# Patient Record
Sex: FEMALE | Race: WHITE | Employment: PART TIME | URBAN - METROPOLITAN AREA
[De-identification: names, ages, dates, MRNs, and addresses within clinical notes are randomized per-mention and may not be internally consistent; named-entity substitution may affect disease eponyms.]

---

## 2017-04-01 LAB
CHOLESTEROL: 191 MG/DL
GLUCOSE BLD-MCNC: 96 MG/DL (ref 70–140)
HDLC SERPL-MCNC: 39 MG/DL
LDL CHOLESTEROL CALCULATED: 103 MG/DL
PATIENT FASTING?: YES
TRIGL SERPL-MCNC: 243 MG/DL

## 2017-04-04 ENCOUNTER — EMPLOYEE WELLNESS (OUTPATIENT)
Dept: OTHER | Age: 42
End: 2017-04-04

## 2017-07-12 ENCOUNTER — HOSPITAL ENCOUNTER (OUTPATIENT)
Dept: ULTRASOUND IMAGING | Age: 42
Discharge: HOME | End: 2017-07-12
Attending: PHYSICIAN ASSISTANT | Admitting: PHYSICIAN ASSISTANT

## 2017-07-12 DIAGNOSIS — R11.2 NAUSEA AND VOMITING, INTRACTABILITY OF VOMITING NOT SPECIFIED, UNSPECIFIED VOMITING TYPE: ICD-10-CM

## 2017-07-12 DIAGNOSIS — R19.7 DIARRHEA, UNSPECIFIED TYPE: ICD-10-CM

## 2017-07-12 DIAGNOSIS — R10.13 ABDOMINAL PAIN, EPIGASTRIC: ICD-10-CM

## 2017-07-12 DIAGNOSIS — R10.11 ABDOMINAL PAIN, RIGHT UPPER QUADRANT: ICD-10-CM

## 2017-07-12 LAB
ALBUMIN SERPL-MCNC: 4.2 GM/DL (ref 3.4–5)
ALP BLD-CCNC: 115 IU/L (ref 40–129)
ALT SERPL-CCNC: 130 U/L (ref 10–40)
AMYLASE: 27 U/L (ref 25–115)
ANION GAP SERPL CALCULATED.3IONS-SCNC: 10 MMOL/L (ref 4–16)
AST SERPL-CCNC: 37 IU/L (ref 15–37)
BASOPHILS ABSOLUTE: 0.1 K/CU MM
BASOPHILS RELATIVE PERCENT: 0.6 % (ref 0–1)
BILIRUB SERPL-MCNC: 0.2 MG/DL (ref 0–1)
BILIRUBIN DIRECT: 0.2 MG/DL (ref 0–0.3)
BILIRUBIN, INDIRECT: 0 MG/DL (ref 0–0.7)
BUN BLDV-MCNC: 14 MG/DL (ref 6–23)
CALCIUM SERPL-MCNC: 9 MG/DL (ref 8.3–10.6)
CHLORIDE BLD-SCNC: 102 MMOL/L (ref 99–110)
CO2: 29 MMOL/L (ref 21–32)
CREAT SERPL-MCNC: 0.8 MG/DL (ref 0.6–1.1)
DIFFERENTIAL TYPE: ABNORMAL
EOSINOPHILS ABSOLUTE: 0.5 K/CU MM
EOSINOPHILS RELATIVE PERCENT: 5.9 % (ref 0–3)
GAMMA GLUTAMYL TRANSFERASE: 277 IU/L (ref 5–36)
GFR AFRICAN AMERICAN: >60 ML/MIN/1.73M2
GFR NON-AFRICAN AMERICAN: >60 ML/MIN/1.73M2
GLUCOSE BLD-MCNC: 102 MG/DL (ref 70–140)
HCT VFR BLD CALC: 41.8 % (ref 37–47)
HEMOGLOBIN: 13.8 GM/DL (ref 12.5–16)
IMMATURE NEUTROPHIL %: 0.3 % (ref 0–0.43)
LIPASE: 29 IU/L (ref 13–60)
LYMPHOCYTES ABSOLUTE: 2.9 K/CU MM
LYMPHOCYTES RELATIVE PERCENT: 31.8 % (ref 24–44)
MCH RBC QN AUTO: 28.5 PG (ref 27–31)
MCHC RBC AUTO-ENTMCNC: 33 % (ref 32–36)
MCV RBC AUTO: 86.4 FL (ref 78–100)
MONOCYTES ABSOLUTE: 0.5 K/CU MM
MONOCYTES RELATIVE PERCENT: 5.3 % (ref 0–4)
NUCLEATED RBC %: 0 %
PDW BLD-RTO: 12.7 % (ref 11.7–14.9)
PLATELET # BLD: 255 K/CU MM (ref 140–440)
PMV BLD AUTO: 9.2 FL (ref 7.5–11.1)
POTASSIUM SERPL-SCNC: 4.2 MMOL/L (ref 3.5–5.1)
RBC # BLD: 4.84 M/CU MM (ref 4.2–5.4)
SEGMENTED NEUTROPHILS ABSOLUTE COUNT: 5.1 K/CU MM
SEGMENTED NEUTROPHILS RELATIVE PERCENT: 56.1 % (ref 36–66)
SODIUM BLD-SCNC: 141 MMOL/L (ref 135–145)
TOTAL IMMATURE NEUTOROPHIL: 0.03 K/CU MM
TOTAL NUCLEATED RBC: 0 K/CU MM
TOTAL PROTEIN: 6.7 GM/DL (ref 6.4–8.2)
WBC # BLD: 9 K/CU MM (ref 4–10.5)

## 2017-08-02 PROBLEM — K80.20 CALCULUS OF GALLBLADDER WITHOUT CHOLECYSTITIS WITHOUT OBSTRUCTION: Status: ACTIVE | Noted: 2017-08-02

## 2017-08-21 PROBLEM — Z90.49 S/P LAPAROSCOPIC CHOLECYSTECTOMY: Status: ACTIVE | Noted: 2017-08-21

## 2017-09-13 ENCOUNTER — OFFICE VISIT (OUTPATIENT)
Dept: FAMILY MEDICINE CLINIC | Age: 42
End: 2017-09-13

## 2017-09-13 VITALS
TEMPERATURE: 98.1 F | HEART RATE: 77 BPM | DIASTOLIC BLOOD PRESSURE: 78 MMHG | HEIGHT: 61 IN | BODY MASS INDEX: 31.98 KG/M2 | WEIGHT: 169.4 LBS | SYSTOLIC BLOOD PRESSURE: 108 MMHG | OXYGEN SATURATION: 98 %

## 2017-09-13 DIAGNOSIS — R82.81 PYURIA: ICD-10-CM

## 2017-09-13 DIAGNOSIS — N30.00 ACUTE CYSTITIS WITHOUT HEMATURIA: Primary | ICD-10-CM

## 2017-09-13 DIAGNOSIS — R35.0 URINARY FREQUENCY: ICD-10-CM

## 2017-09-13 LAB
BILIRUBIN, POC: NORMAL
BLOOD URINE, POC: NORMAL
CLARITY, POC: CLEAR
COLOR, POC: YELLOW
GLUCOSE URINE, POC: NORMAL
KETONES, POC: NORMAL
LEUKOCYTE EST, POC: NORMAL
NITRITE, POC: NORMAL
PH, POC: 8
PROTEIN, POC: NORMAL
SPECIFIC GRAVITY, POC: 1.01
UROBILINOGEN, POC: NORMAL

## 2017-09-13 PROCEDURE — 81002 URINALYSIS NONAUTO W/O SCOPE: CPT | Performed by: NURSE PRACTITIONER

## 2017-09-13 PROCEDURE — 99213 OFFICE O/P EST LOW 20 MIN: CPT | Performed by: NURSE PRACTITIONER

## 2017-09-13 RX ORDER — SULFAMETHOXAZOLE AND TRIMETHOPRIM 800; 160 MG/1; MG/1
1 TABLET ORAL 2 TIMES DAILY
Qty: 6 TABLET | Refills: 0 | Status: SHIPPED | OUTPATIENT
Start: 2017-09-13 | End: 2017-09-16

## 2017-09-13 ASSESSMENT — PATIENT HEALTH QUESTIONNAIRE - PHQ9
SUM OF ALL RESPONSES TO PHQ9 QUESTIONS 1 & 2: 0
SUM OF ALL RESPONSES TO PHQ QUESTIONS 1-9: 0
1. LITTLE INTEREST OR PLEASURE IN DOING THINGS: 0
2. FEELING DOWN, DEPRESSED OR HOPELESS: 0

## 2017-09-13 ASSESSMENT — ENCOUNTER SYMPTOMS: GASTROINTESTINAL NEGATIVE: 1

## 2017-09-15 ENCOUNTER — TELEPHONE (OUTPATIENT)
Dept: FAMILY MEDICINE CLINIC | Age: 42
End: 2017-09-15

## 2017-09-15 LAB — URINE CULTURE, ROUTINE: NORMAL

## 2018-01-10 ENCOUNTER — OFFICE VISIT (OUTPATIENT)
Dept: FAMILY MEDICINE CLINIC | Age: 43
End: 2018-01-10

## 2018-01-10 VITALS
OXYGEN SATURATION: 98 % | DIASTOLIC BLOOD PRESSURE: 74 MMHG | HEART RATE: 73 BPM | BODY MASS INDEX: 33.3 KG/M2 | WEIGHT: 174.8 LBS | TEMPERATURE: 97.8 F | SYSTOLIC BLOOD PRESSURE: 102 MMHG

## 2018-01-10 DIAGNOSIS — M54.5 LOW BACK PAIN, UNSPECIFIED BACK PAIN LATERALITY, UNSPECIFIED CHRONICITY, WITH SCIATICA PRESENCE UNSPECIFIED: Primary | ICD-10-CM

## 2018-01-10 DIAGNOSIS — N89.8 VAGINAL ITCHING: ICD-10-CM

## 2018-01-10 LAB
BILIRUBIN, POC: NORMAL
BLOOD URINE, POC: NORMAL
CLARITY, POC: CLEAR
COLOR, POC: NORMAL
GLUCOSE URINE, POC: NORMAL
KETONES, POC: NORMAL
LEUKOCYTE EST, POC: NORMAL
NITRITE, POC: NORMAL
PH, POC: 5.5
PROTEIN, POC: NORMAL
SPECIFIC GRAVITY, POC: 1.03
UROBILINOGEN, POC: NORMAL

## 2018-01-10 PROCEDURE — 81002 URINALYSIS NONAUTO W/O SCOPE: CPT | Performed by: NURSE PRACTITIONER

## 2018-01-10 PROCEDURE — 99213 OFFICE O/P EST LOW 20 MIN: CPT | Performed by: NURSE PRACTITIONER

## 2018-01-10 RX ORDER — NAPROXEN 500 MG/1
500 TABLET ORAL 2 TIMES DAILY WITH MEALS
Qty: 28 TABLET | Refills: 0 | Status: SHIPPED | OUTPATIENT
Start: 2018-01-10 | End: 2019-02-04 | Stop reason: ALTCHOICE

## 2018-01-10 RX ORDER — ACETAMINOPHEN 500 MG
500 TABLET ORAL EVERY 6 HOURS PRN
COMMUNITY

## 2018-01-10 RX ORDER — CYCLOBENZAPRINE HCL 5 MG
5 TABLET ORAL NIGHTLY PRN
Qty: 10 TABLET | Refills: 0 | Status: SHIPPED | OUTPATIENT
Start: 2018-01-10 | End: 2018-01-20

## 2018-01-10 RX ORDER — FLUCONAZOLE 150 MG/1
150 TABLET ORAL ONCE
Qty: 1 TABLET | Refills: 0 | Status: SHIPPED | OUTPATIENT
Start: 2018-01-10 | End: 2018-01-10

## 2018-01-10 ASSESSMENT — ENCOUNTER SYMPTOMS
SHORTNESS OF BREATH: 0
BOWEL INCONTINENCE: 0
VOMITING: 0
WHEEZING: 0
NAUSEA: 0
CHEST TIGHTNESS: 0
CONSTIPATION: 0
DIARRHEA: 0
BACK PAIN: 1

## 2018-01-10 NOTE — PROGRESS NOTES
Rivasil Aide  1975  43 y.o. SUBJECT ALMAS:    Chief Complaint   Patient presents with    Back Pain     Lower back pain x 2 days. She lifted a heavy load of laundry. It is worse with movement.  Vaginitis     x 2 days, she took a bath in lavender epsom salts and thinks this caused it. Otherwise well 43year old female presents for acute low back pain for which she took an epsom salt bath and now believes she has a yeast infection as she is having new white, cottage cheese like discharge and itching. Back Pain   This is a new problem. The current episode started in the past 7 days (sudden pain with lifting laundry basket and twisting, exacerbated by pushing computer on wheels at work for 8 hours this past weekend). The problem occurs constantly. The pain is present in the lumbar spine. Quality: sharp. The pain does not radiate. The pain is at a severity of 8/10. The pain is moderate. The pain is worse during the day. Exacerbated by: lifting; no impact with coughing or sneezing. Stiffness is present in the morning. Pertinent negatives include no bladder incontinence, bowel incontinence, chest pain, dysuria, fever, headaches, leg pain, numbness, paresthesias, pelvic pain, perianal numbness or weakness. She has tried heat for the symptoms. The treatment provided mild relief. Vaginal Discharge   The patient's primary symptoms include genital itching and vaginal discharge. The patient's pertinent negatives include no genital lesions or pelvic pain. This is a new problem. The current episode started yesterday. The problem occurs constantly. The problem has been gradually worsening. She is not pregnant. Associated symptoms include back pain. Pertinent negatives include no chills, constipation, diarrhea, dysuria, fever, flank pain, frequency, headaches, nausea, urgency or vomiting. The vaginal discharge was white and malodorous. There has been no bleeding. She is sexually active.  No, her partner does not have an STD. She uses nothing for contraception. Her menstrual history has been regular. There is no history of an STD. Past Medical History:   Diagnosis Date    Asthma     last flare up 2016- mostly with exertion       Current Outpatient Prescriptions on File Prior to Visit   Medication Sig Dispense Refill    melatonin 3 MG TABS tablet Take 5 mg by mouth daily      albuterol sulfate  (90 Base) MCG/ACT inhaler Inhale 2 puffs into the lungs every 6 hours as needed for Wheezing       No current facility-administered medications on file prior to visit. Past Medical, Family, and Social History have been reviewed today. Review of Systems   Constitutional: Negative for chills, diaphoresis, fatigue and fever. Respiratory: Negative for chest tightness, shortness of breath and wheezing. Cardiovascular: Negative for chest pain and palpitations. Gastrointestinal: Negative for bowel incontinence, constipation, diarrhea, nausea and vomiting. Genitourinary: Positive for vaginal discharge. Negative for bladder incontinence, dysuria, flank pain, frequency, menstrual problem, pelvic pain and urgency. Musculoskeletal: Positive for back pain. Neurological: Negative for dizziness, facial asymmetry, weakness, numbness, headaches and paresthesias. OBJECTIVE:     /74   Pulse 73   Temp 97.8 °F (36.6 °C) (Oral)   Wt 174 lb 12.8 oz (79.3 kg)   LMP 12/13/2017 (Within Days)   SpO2 98%   BMI 33.30 kg/m²     Physical Exam   Constitutional: She is oriented to person, place, and time. She appears well-developed and well-nourished. HENT:   Head: Normocephalic. Cardiovascular: Normal rate, regular rhythm and normal heart sounds. Pulmonary/Chest: Effort normal and breath sounds normal.   Abdominal: Soft. Bowel sounds are normal. There is no tenderness. Musculoskeletal:        Lumbar back: She exhibits decreased range of motion (extension), tenderness, pain and spasm.

## 2018-01-10 NOTE — PATIENT INSTRUCTIONS
We are committed to providing you the best care possible. If you receive a survey after visiting one of our offices, please take time to share your experience concerning your physician office visit. These surveys are confidential and no health information about you is shared. We are eager to improve for you and we are counting on your feedback to help make that happen. Patient Education        Learning About How to Have a Healthy Back  What causes back pain? Back pain is often caused by overuse, strain, or injury. For example, people often hurt their backs playing sports or working in the yard, being jolted in a car accident, or lifting something too heavy. Aging plays a part too. Your bones and muscles tend to lose strength as you age, which makes injury more likely. The spongy discs between the bones of the spine (vertebrae) may suffer from wear and tear and no longer provide enough cushion between the bones. A disc that bulges or breaks open (herniated disc) can press on nerves, causing back pain. In some people, back pain is the result of arthritis, broken vertebrae caused by bone loss (osteoporosis), illness, or a spine problem. Although most people have back pain at one time or another, there are steps you can take to make it less likely. How can you have a healthy back? Reduce stress on your back through good posture  Slumping or slouching alone may not cause low back pain. But after the back has been strained or injured, bad posture can make pain worse. · Sleep in a position that maintains your back's normal curves and on a mattress that feels comfortable. Sleep on your side with a pillow between your knees, or sleep on your back with a pillow under your knees. These positions can reduce strain on your back. · Stand and sit up straight. \"Good posture\" generally means your ears, shoulders, and hips are in a straight line.   · If you must stand for a long time, put one foot on a stool, ledge, or box. Switch feet every now and then. · Sit in a chair that is low enough to let you place both feet flat on the floor with both knees nearly level with your hips. If your chair or desk is too high, use a footrest to raise your knees. Place a small pillow, a rolled-up towel, or a lumbar roll in the curve of your back if you need extra support. · Try a kneeling chair, which helps tilt your hips forward. This takes pressure off your lower back. · Try sitting on an exercise ball. It can rock from side to side, which helps keep your back loose. · When driving, keep your knees nearly level with your hips. Sit straight, and drive with both hands on the steering wheel. Your arms should be in a slightly bent position. Reduce stress on your back through careful lifting  · Squat down, bending at the hips and knees only. If you need to, put one knee to the floor and extend your other knee in front of you, bent at a right angle (half kneeling). · Press your chest straight forward. This helps keep your upper back straight while keeping a slight arch in your low back. · Hold the load as close to your body as possible, at the level of your belly button (navel). · Use your feet to change direction, taking small steps. · Lead with your hips as you change direction. Keep your shoulders in line with your hips as you move. · Set down your load carefully, squatting with your knees and hips only. Exercise and stretch your back  · Do some exercise on most days of the week, if your doctor says it is okay. You can walk, run, swim, or cycle. · Stretch your back muscles. Here are a few exercises to try:  Caren Arce on your back, and gently pull one bent knee to your chest. Put that foot back on the floor, and then pull the other knee to your chest.  ¨ Do pelvic tilts. Lie on your back with your knees bent. Tighten your stomach muscles. Pull your belly button (navel) in and up toward your ribs.  You should feel like your back is pressing to the floor and your hips and pelvis are slightly lifting off the floor. Hold for 6 seconds while breathing smoothly. ¨ Sit with your back flat against a wall. · Keep your core muscles strong. The muscles of your back, belly (abdomen), and buttocks support your spine. ¨ Pull in your belly and imagine pulling your navel toward your spine. Hold this for 6 seconds, then relax. Remember to keep breathing normally as you tense your muscles. ¨ Do curl-ups. Always do them with your knees bent. Keep your low back on the floor, and curl your shoulders toward your knees using a smooth, slow motion. Keep your arms folded across your chest. If this bothers your neck, try putting your hands behind your neck (not your head), with your elbows spread apart. ¨ Lie on your back with your knees bent and your feet flat on the floor. Tighten your belly muscles, and then push with your feet and raise your buttocks up a few inches. Hold this position 6 seconds as you continue to breathe normally, then lower yourself slowly to the floor. Repeat 8 to 12 times. ¨ If you like group exercise, try Pilates or yoga. These classes have poses that strengthen the core muscles. Lead a healthy lifestyle  · Stay at a healthy weight to avoid strain on your back. · Do not smoke. Smoking increases the risk of osteoporosis, which weakens the spine. If you need help quitting, talk to your doctor about stop-smoking programs and medicines. These can increase your chances of quitting for good. Where can you learn more? Go to https://[employer]. org and sign in to your [employer] account. Enter L315 in the [employer] box to learn more about \"Learning About How to Have a Healthy Back. \"     If you do not have an account, please click on the \"Sign Up Now\" link. Current as of: March 21, 2017  Content Version: 11.5  © 3538-1922 [employer]. Care instructions adapted under license by [facility] ([facility]).  If you have questions about a medical condition or this instruction, always ask your healthcare professional. [address] any warranty or liability for your use of this information. Patient Education        Back Stretches: Exercises  Your Care Instructions  Here are some examples of exercises for stretching your back. Start each exercise slowly. Ease off the exercise if you start to have pain. Your doctor or physical therapist will tell you when you can start these exercises and which ones will work best for you. How to do the exercises  Overhead stretch    1. Stand comfortably with your feet shoulder-width apart. 2. Looking straight ahead, raise both arms over your head and reach toward the ceiling. Do not allow your head to tilt back. 3. Hold for 15 to 30 seconds, then lower your arms to your sides. 4. Repeat 2 to 4 times. Side stretch    1. Stand comfortably with your feet shoulder-width apart. 2. Raise one arm over your head, and then lean to the other side. 3. Slide your hand down your leg as you let the weight of your arm gently stretch your side muscles. Hold for 15 to 30 seconds. 4. Repeat 2 to 4 times on each side. Press-up    1. Lie on your stomach, supporting your body with your forearms. 2. Press your elbows down into the floor to raise your upper back. As you do this, relax your stomach muscles and allow your back to arch without using your back muscles. As your press up, do not let your hips or pelvis come off the floor. 3. Hold for 15 to 30 seconds, then relax. 4. Repeat 2 to 4 times. Relax and rest    1. Lie on your back with a rolled towel under your neck and a pillow under your knees. Extend your arms comfortably to your sides. 2. Relax and breathe normally. 3. Remain in this position for about 10 minutes. 4. If you can, do this 2 or 3 times each day. Follow-up care is a key part of your treatment and safety.  Be sure to make and go to all appointments, and

## 2018-03-20 VITALS — WEIGHT: 166 LBS | BODY MASS INDEX: 31.37 KG/M2

## 2018-06-11 ENCOUNTER — EMPLOYEE WELLNESS (OUTPATIENT)
Dept: OTHER | Age: 43
End: 2018-06-11

## 2018-06-11 LAB
CHOLESTEROL: 158 MG/DL
GLUCOSE BLD-MCNC: 96 MG/DL (ref 70–99)
HDLC SERPL-MCNC: 42 MG/DL
LDL CHOLESTEROL CALCULATED: 79 MG/DL
PATIENT FASTING?: YES
TRIGL SERPL-MCNC: 187 MG/DL

## 2018-06-18 ENCOUNTER — OFFICE VISIT (OUTPATIENT)
Dept: FAMILY MEDICINE CLINIC | Age: 43
End: 2018-06-18

## 2018-06-18 VITALS
BODY MASS INDEX: 32.77 KG/M2 | DIASTOLIC BLOOD PRESSURE: 80 MMHG | SYSTOLIC BLOOD PRESSURE: 124 MMHG | HEART RATE: 82 BPM | HEIGHT: 61 IN | WEIGHT: 173.6 LBS | OXYGEN SATURATION: 98 %

## 2018-06-18 DIAGNOSIS — F51.01 PRIMARY INSOMNIA: ICD-10-CM

## 2018-06-18 DIAGNOSIS — R06.2 WHEEZING: ICD-10-CM

## 2018-06-18 DIAGNOSIS — J30.1 SEASONAL ALLERGIC RHINITIS DUE TO POLLEN, UNSPECIFIED CHRONICITY: ICD-10-CM

## 2018-06-18 DIAGNOSIS — E78.1 HYPERTRIGLYCERIDEMIA: Primary | ICD-10-CM

## 2018-06-18 PROCEDURE — 99213 OFFICE O/P EST LOW 20 MIN: CPT | Performed by: FAMILY MEDICINE

## 2018-06-19 VITALS — WEIGHT: 171 LBS | BODY MASS INDEX: 32.58 KG/M2

## 2018-06-29 ASSESSMENT — ENCOUNTER SYMPTOMS
ABDOMINAL PAIN: 0
DOUBLE VISION: 0
SPUTUM PRODUCTION: 0
COUGH: 0
STRIDOR: 0
CONSTIPATION: 0
BLOOD IN STOOL: 0
SHORTNESS OF BREATH: 0
BLURRED VISION: 0
WHEEZING: 0
EYE PAIN: 0
DIARRHEA: 0
BACK PAIN: 0
HEARTBURN: 0

## 2018-09-07 ENCOUNTER — HOSPITAL ENCOUNTER (OUTPATIENT)
Dept: OTHER | Age: 43
Discharge: HOME | End: 2018-09-07
Attending: NURSE PRACTITIONER | Admitting: NURSE PRACTITIONER

## 2018-09-07 ENCOUNTER — OFFICE VISIT (OUTPATIENT)
Dept: FAMILY MEDICINE CLINIC | Age: 43
End: 2018-09-07

## 2018-09-07 VITALS
SYSTOLIC BLOOD PRESSURE: 114 MMHG | DIASTOLIC BLOOD PRESSURE: 68 MMHG | WEIGHT: 172 LBS | OXYGEN SATURATION: 99 % | HEART RATE: 63 BPM | BODY MASS INDEX: 32.77 KG/M2 | TEMPERATURE: 98.3 F

## 2018-09-07 DIAGNOSIS — R09.81 NASAL CONGESTION: ICD-10-CM

## 2018-09-07 DIAGNOSIS — J40 BRONCHITIS: ICD-10-CM

## 2018-09-07 DIAGNOSIS — N39.0 ACUTE UTI: Primary | ICD-10-CM

## 2018-09-07 DIAGNOSIS — R39.9 UTI SYMPTOMS: ICD-10-CM

## 2018-09-07 LAB
BILIRUBIN, POC: NEGATIVE
BLOOD URINE, POC: NEGATIVE
CLARITY, POC: CLEAR
COLOR, POC: YELLOW
GLUCOSE URINE, POC: NEGATIVE
KETONES, POC: NEGATIVE
LEUKOCYTE EST, POC: ABNORMAL
NITRITE, POC: POSITIVE
PH, POC: 6
PROTEIN, POC: NEGATIVE
SPECIFIC GRAVITY, POC: 1.02
UROBILINOGEN, POC: ABNORMAL

## 2018-09-07 PROCEDURE — 81002 URINALYSIS NONAUTO W/O SCOPE: CPT | Performed by: NURSE PRACTITIONER

## 2018-09-07 PROCEDURE — 99213 OFFICE O/P EST LOW 20 MIN: CPT | Performed by: NURSE PRACTITIONER

## 2018-09-07 RX ORDER — AZELASTINE 1 MG/ML
1 SPRAY, METERED NASAL 2 TIMES DAILY
Qty: 1 BOTTLE | Refills: 0 | Status: SHIPPED | OUTPATIENT
Start: 2018-09-07

## 2018-09-07 RX ORDER — METHYLPREDNISOLONE 4 MG/1
TABLET ORAL
Qty: 1 KIT | Refills: 0 | Status: SHIPPED | OUTPATIENT
Start: 2018-09-07 | End: 2018-09-13

## 2018-09-07 RX ORDER — SULFAMETHOXAZOLE AND TRIMETHOPRIM 800; 160 MG/1; MG/1
1 TABLET ORAL 2 TIMES DAILY
Qty: 6 TABLET | Refills: 0 | Status: SHIPPED | OUTPATIENT
Start: 2018-09-07 | End: 2021-10-25 | Stop reason: SDUPTHER

## 2018-09-07 RX ORDER — PROMETHAZINE HYDROCHLORIDE AND CODEINE PHOSPHATE 6.25; 1 MG/5ML; MG/5ML
5 SYRUP ORAL NIGHTLY PRN
Qty: 35 ML | Refills: 0 | Status: SHIPPED | OUTPATIENT
Start: 2018-09-07 | End: 2019-02-04 | Stop reason: SDUPTHER

## 2018-09-07 RX ORDER — BENZONATATE 100 MG/1
100 CAPSULE ORAL 3 TIMES DAILY PRN
Qty: 21 CAPSULE | Refills: 0 | Status: SHIPPED | OUTPATIENT
Start: 2018-09-07 | End: 2018-09-14

## 2018-09-07 ASSESSMENT — ENCOUNTER SYMPTOMS
VOMITING: 0
SINUS PAIN: 0
DIARRHEA: 0
WHEEZING: 0
RHINORRHEA: 1
CHEST TIGHTNESS: 1
SORE THROAT: 0
SHORTNESS OF BREATH: 1
NAUSEA: 0
COUGH: 1
SINUS PRESSURE: 1

## 2018-09-07 NOTE — PROGRESS NOTES
throat. Respiratory: Positive for cough, chest tightness and shortness of breath. Negative for wheezing. Cardiovascular: Negative for chest pain and palpitations. Gastrointestinal: Negative for diarrhea, nausea and vomiting. Genitourinary: Positive for frequency. Negative for flank pain, hematuria, hesitancy and urgency. Musculoskeletal: Negative for myalgias. Skin: Negative for rash. Neurological: Negative for dizziness, light-headedness and headaches. Current Outpatient Prescriptions   Medication Sig Dispense Refill    azelastine (ASTELIN) 0.1 % nasal spray 1 spray by Nasal route 2 times daily Use in each nostril as directed 1 Bottle 0    methylPREDNISolone (MEDROL DOSEPACK) 4 MG tablet Take by mouth. 1 kit 0    promethazine-codeine (PHENERGAN WITH CODEINE) 6.25-10 MG/5ML syrup Take 5 mLs by mouth nightly as needed for Cough for up to 7 days. . 35 mL 0    benzonatate (TESSALON PERLES) 100 MG capsule Take 1 capsule by mouth 3 times daily as needed for Cough 21 capsule 0    Cetirizine HCl (ZYRTEC ALLERGY PO) Take 1 tablet by mouth as needed       DiphenhydrAMINE HCl (BENADRYL ALLERGY PO) Take 1 tablet by mouth as needed      acetaminophen (TYLENOL) 500 MG tablet Take 500 mg by mouth every 6 hours as needed for Pain      melatonin 3 MG TABS tablet Take 5 mg by mouth daily      albuterol sulfate  (90 Base) MCG/ACT inhaler Inhale 2 puffs into the lungs every 6 hours as needed for Wheezing      naproxen (NAPROSYN) 500 MG tablet Take 1 tablet by mouth 2 times daily (with meals) for 14 days 28 tablet 0     No current facility-administered medications for this visit. Past medical, family,surgical history reviewed today. Objective:  /68   Pulse 63   Temp 98.3 °F (36.8 °C) (Oral)   Wt 172 lb (78 kg)   LMP 08/30/2018 (Approximate)   SpO2 99%   Breastfeeding?  No   BMI 32.77 kg/m²   BP Readings from Last 3 Encounters:   09/07/18 114/68   06/18/18 124/80   01/10/18 discontinued medications. Care discussed with patient. Questions answered. Patient verbalizes understanding and agrees with plan. After visit summary provided. Advised to call for any problems, questions, or concerns. Patient will be contacted in two to three days to check on progress.          Return if symptoms worsen or fail to improve.                                               Signed:  SHAHRIAR Govea CNP  09/11/18  5:33 PM

## 2018-09-10 LAB
CULTURE: NORMAL
Lab: NORMAL
ORGANISM: NORMAL
REPORT STATUS: NORMAL
SPECIMEN: NORMAL
TOTAL COLONY COUNT: NORMAL

## 2018-09-11 ENCOUNTER — PATIENT MESSAGE (OUTPATIENT)
Dept: FAMILY MEDICINE CLINIC | Age: 43
End: 2018-09-11

## 2018-10-08 ENCOUNTER — OFFICE VISIT (OUTPATIENT)
Dept: FAMILY MEDICINE CLINIC | Age: 43
End: 2018-10-08
Payer: COMMERCIAL

## 2018-10-08 VITALS
WEIGHT: 172.8 LBS | HEART RATE: 75 BPM | BODY MASS INDEX: 32.92 KG/M2 | DIASTOLIC BLOOD PRESSURE: 78 MMHG | OXYGEN SATURATION: 98 % | SYSTOLIC BLOOD PRESSURE: 118 MMHG

## 2018-10-08 DIAGNOSIS — G89.29 CHRONIC LOW BACK PAIN WITHOUT SCIATICA, UNSPECIFIED BACK PAIN LATERALITY: ICD-10-CM

## 2018-10-08 DIAGNOSIS — M54.50 CHRONIC LOW BACK PAIN WITHOUT SCIATICA, UNSPECIFIED BACK PAIN LATERALITY: ICD-10-CM

## 2018-10-08 PROCEDURE — 99213 OFFICE O/P EST LOW 20 MIN: CPT | Performed by: FAMILY MEDICINE

## 2018-10-08 RX ORDER — PHENTERMINE HYDROCHLORIDE 37.5 MG/1
37.5 TABLET ORAL
Qty: 30 TABLET | Refills: 0 | Status: SHIPPED | OUTPATIENT
Start: 2018-10-08 | End: 2018-11-01 | Stop reason: SDUPTHER

## 2018-10-08 ASSESSMENT — PATIENT HEALTH QUESTIONNAIRE - PHQ9
SUM OF ALL RESPONSES TO PHQ QUESTIONS 1-9: 0
SUM OF ALL RESPONSES TO PHQ QUESTIONS 1-9: 0
SUM OF ALL RESPONSES TO PHQ9 QUESTIONS 1 & 2: 0
2. FEELING DOWN, DEPRESSED OR HOPELESS: 0
1. LITTLE INTEREST OR PLEASURE IN DOING THINGS: 0

## 2018-10-20 ASSESSMENT — ENCOUNTER SYMPTOMS
COUGH: 0
ABDOMINAL PAIN: 0
BLURRED VISION: 0

## 2018-11-01 ENCOUNTER — OFFICE VISIT (OUTPATIENT)
Dept: FAMILY MEDICINE CLINIC | Age: 43
End: 2018-11-01
Payer: COMMERCIAL

## 2018-11-01 VITALS
SYSTOLIC BLOOD PRESSURE: 116 MMHG | HEART RATE: 90 BPM | WEIGHT: 169.4 LBS | DIASTOLIC BLOOD PRESSURE: 70 MMHG | BODY MASS INDEX: 32.27 KG/M2 | OXYGEN SATURATION: 99 %

## 2018-11-01 PROCEDURE — 99213 OFFICE O/P EST LOW 20 MIN: CPT | Performed by: FAMILY MEDICINE

## 2018-11-01 RX ORDER — PHENTERMINE HYDROCHLORIDE 37.5 MG/1
37.5 TABLET ORAL
Qty: 30 TABLET | Refills: 0 | Status: SHIPPED | OUTPATIENT
Start: 2018-11-06 | End: 2018-12-04 | Stop reason: SDUPTHER

## 2018-12-04 ENCOUNTER — OFFICE VISIT (OUTPATIENT)
Dept: FAMILY MEDICINE CLINIC | Age: 43
End: 2018-12-04
Payer: COMMERCIAL

## 2018-12-04 VITALS
DIASTOLIC BLOOD PRESSURE: 70 MMHG | WEIGHT: 167.2 LBS | SYSTOLIC BLOOD PRESSURE: 114 MMHG | BODY MASS INDEX: 31.85 KG/M2 | OXYGEN SATURATION: 98 % | HEART RATE: 86 BPM

## 2018-12-04 DIAGNOSIS — B00.89 HERPETIC DERMATITIS: Primary | ICD-10-CM

## 2018-12-04 PROCEDURE — 99214 OFFICE O/P EST MOD 30 MIN: CPT | Performed by: FAMILY MEDICINE

## 2018-12-04 RX ORDER — PHENTERMINE HYDROCHLORIDE 37.5 MG/1
37.5 TABLET ORAL
Qty: 30 TABLET | Refills: 0 | Status: SHIPPED | OUTPATIENT
Start: 2018-12-04 | End: 2019-01-03

## 2018-12-04 RX ORDER — VALACYCLOVIR HYDROCHLORIDE 1 G/1
1000 TABLET, FILM COATED ORAL DAILY
Qty: 5 TABLET | Refills: 2 | Status: SHIPPED | OUTPATIENT
Start: 2018-12-04 | End: 2018-12-09

## 2018-12-09 ASSESSMENT — ENCOUNTER SYMPTOMS
WHEEZING: 0
BACK PAIN: 0
SHORTNESS OF BREATH: 0
CHEST TIGHTNESS: 0
ABDOMINAL PAIN: 0
COUGH: 0

## 2019-02-04 ENCOUNTER — OFFICE VISIT (OUTPATIENT)
Dept: FAMILY MEDICINE CLINIC | Age: 44
End: 2019-02-04
Payer: COMMERCIAL

## 2019-02-04 VITALS
OXYGEN SATURATION: 98 % | DIASTOLIC BLOOD PRESSURE: 72 MMHG | TEMPERATURE: 98.3 F | BODY MASS INDEX: 30.47 KG/M2 | HEART RATE: 112 BPM | HEIGHT: 62 IN | WEIGHT: 165.6 LBS | SYSTOLIC BLOOD PRESSURE: 108 MMHG

## 2019-02-04 DIAGNOSIS — R68.89 FLU-LIKE SYMPTOMS: ICD-10-CM

## 2019-02-04 DIAGNOSIS — R09.81 NASAL CONGESTION: ICD-10-CM

## 2019-02-04 DIAGNOSIS — J40 BRONCHITIS: Primary | ICD-10-CM

## 2019-02-04 LAB
INFLUENZA A ANTIBODY: NEGATIVE
INFLUENZA B ANTIBODY: NEGATIVE

## 2019-02-04 PROCEDURE — 87804 INFLUENZA ASSAY W/OPTIC: CPT | Performed by: NURSE PRACTITIONER

## 2019-02-04 PROCEDURE — 99213 OFFICE O/P EST LOW 20 MIN: CPT | Performed by: NURSE PRACTITIONER

## 2019-02-04 RX ORDER — BENZONATATE 100 MG/1
100 CAPSULE ORAL 3 TIMES DAILY PRN
Qty: 20 CAPSULE | Refills: 0 | Status: SHIPPED | OUTPATIENT
Start: 2019-02-04 | End: 2019-05-24

## 2019-02-04 RX ORDER — PROMETHAZINE HYDROCHLORIDE AND CODEINE PHOSPHATE 6.25; 1 MG/5ML; MG/5ML
5 SYRUP ORAL NIGHTLY PRN
Qty: 35 ML | Refills: 0 | Status: SHIPPED | OUTPATIENT
Start: 2019-02-04 | End: 2019-02-11

## 2019-02-04 RX ORDER — METHYLPREDNISOLONE 4 MG/1
TABLET ORAL
Qty: 1 KIT | Refills: 0 | Status: SHIPPED | OUTPATIENT
Start: 2019-02-04 | End: 2019-02-10

## 2019-02-04 ASSESSMENT — ENCOUNTER SYMPTOMS
CHEST TIGHTNESS: 1
SHORTNESS OF BREATH: 0
COUGH: 1
HEMOPTYSIS: 0
SORE THROAT: 0
NAUSEA: 0
HEARTBURN: 0
DIARRHEA: 0
SINUS PAIN: 0
WHEEZING: 0
RHINORRHEA: 1
SINUS PRESSURE: 1
VOMITING: 1

## 2019-02-09 ENCOUNTER — TELEPHONE (OUTPATIENT)
Dept: FAMILY MEDICINE CLINIC | Age: 44
End: 2019-02-09

## 2019-02-09 RX ORDER — AZITHROMYCIN 250 MG/1
TABLET, FILM COATED ORAL
Qty: 6 TABLET | Refills: 0 | Status: SHIPPED | OUTPATIENT
Start: 2019-02-09 | End: 2019-05-24

## 2019-05-24 ENCOUNTER — OFFICE VISIT (OUTPATIENT)
Dept: FAMILY MEDICINE CLINIC | Age: 44
End: 2019-05-24
Payer: COMMERCIAL

## 2019-05-24 VITALS
SYSTOLIC BLOOD PRESSURE: 122 MMHG | BODY MASS INDEX: 31.28 KG/M2 | OXYGEN SATURATION: 96 % | HEART RATE: 73 BPM | WEIGHT: 171 LBS | DIASTOLIC BLOOD PRESSURE: 72 MMHG

## 2019-05-24 DIAGNOSIS — Z00.00 WELL ADULT EXAM: Primary | ICD-10-CM

## 2019-05-24 DIAGNOSIS — Z23 NEED FOR PROPHYLACTIC VACCINATION AGAINST DIPHTHERIA-TETANUS-PERTUSSIS (DTP): ICD-10-CM

## 2019-05-24 LAB
A/G RATIO: 1.8 (ref 1.1–2.2)
ALBUMIN SERPL-MCNC: 4.5 G/DL (ref 3.4–5)
ALP BLD-CCNC: 87 U/L (ref 40–129)
ALT SERPL-CCNC: 19 U/L (ref 10–40)
ANION GAP SERPL CALCULATED.3IONS-SCNC: 12 MMOL/L (ref 3–16)
AST SERPL-CCNC: 16 U/L (ref 15–37)
BILIRUB SERPL-MCNC: 0.4 MG/DL (ref 0–1)
BUN BLDV-MCNC: 12 MG/DL (ref 7–20)
CALCIUM SERPL-MCNC: 9.1 MG/DL (ref 8.3–10.6)
CHLORIDE BLD-SCNC: 104 MMOL/L (ref 99–110)
CHOLESTEROL, FASTING: 193 MG/DL (ref 0–199)
CO2: 24 MMOL/L (ref 21–32)
CREAT SERPL-MCNC: 0.7 MG/DL (ref 0.6–1.1)
GFR AFRICAN AMERICAN: >60
GFR NON-AFRICAN AMERICAN: >60
GLOBULIN: 2.5 G/DL
GLUCOSE FASTING: 109 MG/DL (ref 70–99)
HDLC SERPL-MCNC: 40 MG/DL (ref 40–60)
LDL CHOLESTEROL CALCULATED: 114 MG/DL
POTASSIUM SERPL-SCNC: 3.9 MMOL/L (ref 3.5–5.1)
SODIUM BLD-SCNC: 140 MMOL/L (ref 136–145)
TOTAL PROTEIN: 7 G/DL (ref 6.4–8.2)
TRIGLYCERIDE, FASTING: 195 MG/DL (ref 0–150)
VLDLC SERPL CALC-MCNC: 39 MG/DL

## 2019-05-24 PROCEDURE — 90471 IMMUNIZATION ADMIN: CPT | Performed by: FAMILY MEDICINE

## 2019-05-24 PROCEDURE — 90715 TDAP VACCINE 7 YRS/> IM: CPT | Performed by: FAMILY MEDICINE

## 2019-05-24 PROCEDURE — 99396 PREV VISIT EST AGE 40-64: CPT | Performed by: FAMILY MEDICINE

## 2019-05-24 PROCEDURE — 36415 COLL VENOUS BLD VENIPUNCTURE: CPT | Performed by: FAMILY MEDICINE

## 2019-05-24 ASSESSMENT — ENCOUNTER SYMPTOMS
SHORTNESS OF BREATH: 0
CHEST TIGHTNESS: 0
WHEEZING: 0
ABDOMINAL PAIN: 0
COUGH: 0
BACK PAIN: 0

## 2019-05-24 ASSESSMENT — PATIENT HEALTH QUESTIONNAIRE - PHQ9
SUM OF ALL RESPONSES TO PHQ QUESTIONS 1-9: 0
2. FEELING DOWN, DEPRESSED OR HOPELESS: 0
SUM OF ALL RESPONSES TO PHQ QUESTIONS 1-9: 0
1. LITTLE INTEREST OR PLEASURE IN DOING THINGS: 0
SUM OF ALL RESPONSES TO PHQ9 QUESTIONS 1 & 2: 0

## 2019-05-24 NOTE — PATIENT INSTRUCTIONS
Consider Dean Yip or similar now to reduce risk for macular degeneration, and consider seeing eye MD/DO and not just OD.

## 2019-05-24 NOTE — PROGRESS NOTES
Chief Complaint   Patient presents with    Annual Exam     be well with in checkup, waist measurement and other vitals, and FBW    Wound Check     on right foot, not infected or anything    Allergic Reaction     with APOORVA, not too many sx right now using benadryl prn       [place] NARRATIVE:    [id number]   Employee id number. She is here requesting us to do a [employer] Well [employer] employee health assessment. She understand it will be a comprehensive preventive exam for us and that we must order a CMP and lipids and cannot order the specialized BWW panel. Weight and waist measurement are recorded. Form is completed for information we have today and will be kept until labs return. .    FHx of mother with a fib and father with mac degen. Mom and dad with htn and cholesterol and DM2 in mom. This patient sees eye doc about every other year. RECENTLY (2 days) stepped on a nail working on her house,  wound almost an inch in through shoe and sock. She states minimal pain and no sign of infection and declines for this to be evaluated today. Allergies fair with need for nighttime benadryl a few nights a week. Weight is up slightly, BP is fine although increased from last visit. Patient is established unless otherwise noted. Above chief complaint and [place] obtained by this physician provider. Review of Systems   Constitutional: Negative for activity change, chills, fatigue and fever. HENT: Negative for congestion (intermittent sinus congestion). Respiratory: Negative for cough, chest tightness, shortness of breath and wheezing. Cardiovascular: Negative for chest pain and leg swelling. Gastrointestinal: Negative for abdominal pain. Musculoskeletal: Negative for back pain and myalgias. Skin: Negative for rash. Psychiatric/Behavioral: Negative for behavioral problems and dysphoric mood. No Known Allergies  Allergy historyupdated.     Outpatient Medications Marked as Taking for the 5/24/19 encounter (Office Visit) with Barrie Petersen MD   Medication Sig Dispense Refill    azelastine (ASTELIN) 0.1 % nasal spray 1 spray by Nasal route 2 times daily Use in each nostril as directed 1 Bottle 0    Cetirizine HCl (ZYRTEC ALLERGY PO) Take 1 tablet by mouth as needed       DiphenhydrAMINE HCl (BENADRYL ALLERGY PO) Take 1 tablet by mouth as needed      acetaminophen (TYLENOL) 500 MG tablet Take 500 mg by mouth every 6 hours as needed for Pain      melatonin 3 MG TABS tablet Take 5 mg by mouth daily      albuterol sulfate  (90 Base) MCG/ACT inhaler Inhale 2 puffs into the lungs every 6 hours as needed for Wheezing         Tobacco use history updated. Social History     Tobacco Use   Smoking Status Never Smoker   Smokeless Tobacco Never Used        Nursing note reviewed. Vitals:    05/24/19 1110   BP: 122/72   Site: Left Upper Arm   Position: Sitting   Cuff Size: Medium Adult   Pulse: 73   SpO2: 96%   Weight: 171 lb (77.6 kg)          BP Readings from Last 3 Encounters:   05/24/19 122/72   02/04/19 108/72   12/04/18 114/70     Wt Readings from Last 3 Encounters:   05/24/19 171 lb (77.6 kg)   02/04/19 165 lb 9.6 oz (75.1 kg)   12/04/18 167 lb 3.2 oz (75.8 kg)     Body mass index is 31.28 kg/m². No results found for this visit on 05/24/19. Physical Exam   Constitutional: She is oriented to person, place, and time. She appears well-developed and well-nourished. No distress. HENT:   Head: Normocephalic and atraumatic. Right Ear: External ear normal.   Left Ear: External ear normal.   Mouth/Throat: Oropharynx is clear and moist.   Eyes: Pupils are equal, round, and reactive to light. Conjunctivae are normal. Right eye exhibits no discharge. Left eye exhibits no discharge. Neck: Normal range of motion. Neck supple. No thyromegaly present. Cardiovascular: Normal rate, regular rhythm and normal heart sounds. Exam reveals no gallop. No murmur heard.   Pulmonary/Chest: Effort normal and breath sounds normal. No respiratory distress. She has no wheezes. She has no rales. Abdominal: Soft. Bowel sounds are normal. She exhibits no distension. Musculoskeletal: She exhibits no edema. Lymphadenopathy:     She has no cervical adenopathy. Neurological: She is alert and oriented to person, place, and time. Skin: Skin is warm and dry. No rash noted. She is not diaphoretic. No erythema. Psychiatric: She has a normal mood and affect. Her behavior is normal.   Nursing note and vitals reviewed. _________________________________________________  Assessment:     Princess Almazan was seen today for annual exam, wound check and allergic reaction. Diagnoses and all orders for this visit:    Well adult exam  -     Comprehensive Metabolic Panel, Fasting  -     Lipid, Fasting    Need for prophylactic vaccination against diphtheria-tetanus-pertussis (DTP)  -     Tdap (age 6y and older) IM (Boostrix)    BMI 31.0-31.9,adult      Problems listed above are stable and therapeutic plan is unchanged unless otherwise specified. See orders above and comments below for details of workup ormedication orders. _________________________________________________  Plan:     ATB only if foot wound is bad. Tdap given today. CONSIDER STARTING EYE VITAMIN GIVEN fhx MAC DEGEN. Labs ordered as above, will be added to BWW form and faxed, SHE IS ADVISED TO [address]. Return in about 1 year (around 5/24/2020), or if symptoms worsen or fail to improve, for annual f-u, pap next time.       Electronically signed by Cedric Mccoy MD on 5/24/19 at 11:20 AM

## 2019-09-17 ENCOUNTER — OFFICE VISIT (OUTPATIENT)
Dept: FAMILY MEDICINE CLINIC | Age: 44
End: 2019-09-17
Payer: COMMERCIAL

## 2019-09-17 VITALS
WEIGHT: 170.6 LBS | SYSTOLIC BLOOD PRESSURE: 118 MMHG | BODY MASS INDEX: 31.2 KG/M2 | HEART RATE: 78 BPM | DIASTOLIC BLOOD PRESSURE: 80 MMHG | OXYGEN SATURATION: 96 %

## 2019-09-17 DIAGNOSIS — R73.9 HYPERGLYCEMIA: Primary | ICD-10-CM

## 2019-09-17 DIAGNOSIS — E78.1 HYPERTRIGLYCERIDEMIA: ICD-10-CM

## 2019-09-17 LAB — HBA1C MFR BLD: 5.6 %

## 2019-09-17 PROCEDURE — 83036 HEMOGLOBIN GLYCOSYLATED A1C: CPT | Performed by: FAMILY MEDICINE

## 2019-09-17 PROCEDURE — 99213 OFFICE O/P EST LOW 20 MIN: CPT | Performed by: FAMILY MEDICINE

## 2019-09-17 ASSESSMENT — ENCOUNTER SYMPTOMS
COUGH: 0
SHORTNESS OF BREATH: 0

## 2019-09-17 NOTE — PROGRESS NOTES
file     Emotionally abused: Not on file     Physically abused: Not on file     Forced sexual activity: Not on file   Other Topics Concern    Not on file   Social History Narrative    Not on file       No Known Allergies  Current Outpatient Medications   Medication Sig Dispense Refill    azelastine (ASTELIN) 0.1 % nasal spray 1 spray by Nasal route 2 times daily Use in each nostril as directed 1 Bottle 0    Cetirizine HCl (ZYRTEC ALLERGY PO) Take 1 tablet by mouth as needed       DiphenhydrAMINE HCl (BENADRYL ALLERGY PO) Take 1 tablet by mouth as needed      acetaminophen (TYLENOL) 500 MG tablet Take 500 mg by mouth every 6 hours as needed for Pain      melatonin 3 MG TABS tablet Take 5 mg by mouth daily      albuterol sulfate  (90 Base) MCG/ACT inhaler Inhale 2 puffs into the lungs every 6 hours as needed for Wheezing       No current facility-administered medications for this visit. Review of Systems   Constitutional: Negative for activity change, chills, fatigue and fever. Respiratory: Negative for cough and shortness of breath. Cardiovascular: Negative for chest pain and leg swelling. Neurological: Negative for dizziness and headaches. Psychiatric/Behavioral: Negative for agitation and sleep disturbance. The patient is not nervous/anxious.         Lab Results   Component Value Date    WBC 9.0 07/12/2017    HGB 13.8 07/12/2017    HCT 41.8 07/12/2017    MCV 86.4 07/12/2017     07/12/2017     Lab Results   Component Value Date     05/24/2019    K 3.9 05/24/2019     05/24/2019    CO2 24 05/24/2019    BUN 12 05/24/2019    CREATININE 0.7 05/24/2019    GLUCOSE 96 06/11/2018    CALCIUM 9.1 05/24/2019    PROT 7.0 05/24/2019    LABALBU 4.5 05/24/2019    BILITOT 0.4 05/24/2019    ALKPHOS 87 05/24/2019    AST 16 05/24/2019    ALT 19 05/24/2019    LABGLOM >60 05/24/2019    GFRAA >60 05/24/2019    AGRATIO 1.8 05/24/2019    GLOB 2.5 05/24/2019     Lab Results   Component Value

## 2020-03-20 ENCOUNTER — E-VISIT (OUTPATIENT)
Dept: FAMILY MEDICINE CLINIC | Age: 45
End: 2020-03-20
Payer: COMMERCIAL

## 2020-03-20 PROCEDURE — 99422 OL DIG E/M SVC 11-20 MIN: CPT | Performed by: NURSE PRACTITIONER

## 2020-03-20 ASSESSMENT — LIFESTYLE VARIABLES: SMOKING_STATUS: NO, I'VE NEVER SMOKED

## 2020-03-21 RX ORDER — METHYLPREDNISOLONE 4 MG/1
TABLET ORAL
Qty: 1 KIT | Refills: 0 | Status: SHIPPED | OUTPATIENT
Start: 2020-03-21 | End: 2020-03-27

## 2020-03-23 ENCOUNTER — TELEPHONE (OUTPATIENT)
Dept: FAMILY MEDICINE CLINIC | Age: 45
End: 2020-03-23

## 2020-03-23 RX ORDER — BENZONATATE 200 MG/1
200 CAPSULE ORAL 3 TIMES DAILY PRN
Qty: 60 CAPSULE | Refills: 1 | Status: SHIPPED | OUTPATIENT
Start: 2020-03-23 | End: 2020-04-12

## 2020-03-23 RX ORDER — PROMETHAZINE HYDROCHLORIDE AND CODEINE PHOSPHATE 6.25; 1 MG/5ML; MG/5ML
5 SYRUP ORAL EVERY 4 HOURS PRN
Qty: 180 ML | Refills: 0 | Status: SHIPPED | OUTPATIENT
Start: 2020-03-23 | End: 2020-03-26

## 2020-04-20 ENCOUNTER — TELEPHONE (OUTPATIENT)
Dept: FAMILY MEDICINE CLINIC | Age: 45
End: 2020-04-20

## 2020-04-21 ENCOUNTER — TELEMEDICINE (OUTPATIENT)
Dept: FAMILY MEDICINE CLINIC | Age: 45
End: 2020-04-21
Payer: COMMERCIAL

## 2020-04-21 PROCEDURE — 99213 OFFICE O/P EST LOW 20 MIN: CPT | Performed by: FAMILY MEDICINE

## 2020-04-21 RX ORDER — ALBUTEROL SULFATE 2.5 MG/3ML
2.5 SOLUTION RESPIRATORY (INHALATION) EVERY 6 HOURS PRN
Qty: 360 EACH | Refills: 3 | Status: SHIPPED | OUTPATIENT
Start: 2020-04-21

## 2020-04-21 RX ORDER — AZITHROMYCIN 250 MG/1
250 TABLET, FILM COATED ORAL SEE ADMIN INSTRUCTIONS
Qty: 6 TABLET | Refills: 0 | Status: SHIPPED | OUTPATIENT
Start: 2020-04-21 | End: 2020-04-26

## 2020-04-21 RX ORDER — ALBUTEROL SULFATE 90 UG/1
2 AEROSOL, METERED RESPIRATORY (INHALATION) 4 TIMES DAILY PRN
Qty: 2 INHALER | Refills: 5 | Status: SHIPPED | OUTPATIENT
Start: 2020-04-21 | End: 2021-05-27 | Stop reason: SDUPTHER

## 2020-04-21 ASSESSMENT — ENCOUNTER SYMPTOMS
CHEST TIGHTNESS: 0
BACK PAIN: 0
ABDOMINAL PAIN: 0
WHEEZING: 0
COUGH: 1
SHORTNESS OF BREATH: 0

## 2020-04-21 NOTE — PROGRESS NOTES
medical treatment and/or call 911 if deemed necessary. Services were provided through a video synchronous discussion virtually to substitute for in-person clinic visit. --Tiffany Agrawal MD on 4/21/2020 at 9:26 AM    An electronic signature was used to authenticate this note.

## 2020-07-11 ENCOUNTER — OFFICE VISIT (OUTPATIENT)
Dept: FAMILY MEDICINE CLINIC | Age: 45
End: 2020-07-11
Payer: COMMERCIAL

## 2020-07-11 VITALS
HEIGHT: 62 IN | DIASTOLIC BLOOD PRESSURE: 74 MMHG | OXYGEN SATURATION: 97 % | WEIGHT: 170 LBS | BODY MASS INDEX: 31.28 KG/M2 | HEART RATE: 81 BPM | TEMPERATURE: 97.6 F | SYSTOLIC BLOOD PRESSURE: 106 MMHG

## 2020-07-11 PROCEDURE — 99213 OFFICE O/P EST LOW 20 MIN: CPT | Performed by: NURSE PRACTITIONER

## 2020-07-11 RX ORDER — KETOCONAZOLE 20 MG/G
CREAM TOPICAL 2 TIMES DAILY
Qty: 30 G | Refills: 2 | Status: SHIPPED | OUTPATIENT
Start: 2020-07-11 | End: 2021-12-14 | Stop reason: SDUPTHER

## 2020-07-11 ASSESSMENT — ENCOUNTER SYMPTOMS
SORE THROAT: 0
NAIL CHANGES: 0
VOMITING: 0
SHORTNESS OF BREATH: 0
COUGH: 0
RHINORRHEA: 0
DIARRHEA: 0
EYE PAIN: 0

## 2020-07-11 NOTE — PROGRESS NOTES
Jg Baird   39 y.o.  female  [id number]      Chief Complaint   Patient presents with    Nail Problem        Subjective:  39 y. o.female is here for a follow up. She has the following chronic/acute medical problems:  Patient Active Problem List   Diagnosis    Calculus of gallbladder without cholecystitis without obstruction    S/P laparoscopic cholecystectomy    BMI 31.0-31.9,adult    Hypertriglyceridemia    Hyperglycemia       Patient is here with complaints left big toe fungus under her nail and right third toe fungus under nail. She states she has had this since June. Patient states she has a callus on her right foot causes her pain at times. Patient states she has had this for quite a while. Rash   This is a new problem. The current episode started 1 to 4 weeks ago (2 weeks). The problem is unchanged. The affected locations include the right lower leg. The rash is characterized by redness (ciruclar). She was exposed to nothing. Pertinent negatives include no anorexia, congestion, cough, diarrhea, eye pain, facial edema, fatigue, fever, joint pain, nail changes, rhinorrhea, shortness of breath, sore throat or vomiting. Past treatments include nothing. Review of Systems   Constitutional: Negative for appetite change, chills, fatigue and fever. HENT: Negative. Negative for congestion, rhinorrhea and sore throat. Eyes: Negative for pain. Respiratory: Negative for cough, chest tightness, shortness of breath and wheezing. Cardiovascular: Negative for chest pain and palpitations. Gastrointestinal: Negative for anorexia, diarrhea, nausea and vomiting. Musculoskeletal: Negative for joint pain. Skin: Positive for rash. Negative for nail changes. Neurological: Negative for dizziness, light-headedness and headaches. Current Outpatient Medications   Medication Sig Dispense Refill    ciclopirox (PENLAC) 8 % solution Apply topically nightly. Paint on to infected toe nails.  1 Bottle 0    ketoconazole (NIZORAL) 2 % cream Apply topically 2 times daily X 10 to 14 days 30 g 2    albuterol sulfate HFA (VENTOLIN HFA) 108 (90 Base) MCG/ACT inhaler Inhale 2 puffs into the lungs 4 times daily as needed for Wheezing 2 Inhaler 5    albuterol (PROVENTIL) (2.5 MG/3ML) 0.083% nebulizer solution Take 3 mLs by nebulization every 6 hours as needed for Wheezing 360 each 3    Cetirizine HCl (ZYRTEC ALLERGY PO) Take 1 tablet by mouth as needed       DiphenhydrAMINE HCl (BENADRYL ALLERGY PO) Take 1 tablet by mouth as needed      melatonin 3 MG TABS tablet Take 5 mg by mouth daily      albuterol sulfate  (90 Base) MCG/ACT inhaler Inhale 2 puffs into the lungs every 6 hours as needed for Wheezing      azelastine (ASTELIN) 0.1 % nasal spray 1 spray by Nasal route 2 times daily Use in each nostril as directed (Patient not taking: Reported on 7/11/2020) 1 Bottle 0    acetaminophen (TYLENOL) 500 MG tablet Take 500 mg by mouth every 6 hours as needed for Pain       No current facility-administered medications for this visit. Past medical, family,surgical history reviewed today. Objective:  /74   Pulse 81   Temp 97.6 °F (36.4 °C) (Temporal)   Ht 5' 2\" (1.575 m)   Wt 170 lb (77.1 kg)   SpO2 97%   Breastfeeding No   BMI 31.09 kg/m²   BP Readings from Last 3 Encounters:   07/11/20 106/74   09/17/19 118/80   05/24/19 122/72     Wt Readings from Last 3 Encounters:   07/11/20 170 lb (77.1 kg)   09/17/19 170 lb 9.6 oz (77.4 kg)   05/24/19 171 lb (77.6 kg)         Physical Exam  Constitutional:       Appearance: Normal appearance. HENT:      Head: Normocephalic. Neck:      Musculoskeletal: Neck supple. Cardiovascular:      Rate and Rhythm: Normal rate and regular rhythm. Heart sounds: Normal heart sounds. Pulmonary:      Effort: Pulmonary effort is normal.      Breath sounds: Normal breath sounds. Feet:      Right foot:      Toenail Condition: Fungal disease present. Left foot:      Skin integrity: Callus present. Toenail Condition: Fungal disease present. Skin:     General: Skin is warm and dry. Neurological:      Mental Status: She is alert and oriented to person, place, and time. Psychiatric:         Mood and Affect: Mood normal.         Behavior: Behavior normal.         Lab Results   Component Value Date    WBC 9.0 07/12/2017    HGB 13.8 07/12/2017    HCT 41.8 07/12/2017    MCV 86.4 07/12/2017     07/12/2017     Lab Results   Component Value Date     05/24/2019    K 3.9 05/24/2019     05/24/2019    CO2 24 05/24/2019    BUN 12 05/24/2019    CREATININE 0.7 05/24/2019    GLUCOSE 96 06/11/2018    CALCIUM 9.1 05/24/2019    PROT 7.0 05/24/2019    LABALBU 4.5 05/24/2019    BILITOT 0.4 05/24/2019    ALKPHOS 87 05/24/2019    AST 16 05/24/2019    ALT 19 05/24/2019    LABGLOM >60 05/24/2019    GFRAA >60 05/24/2019    AGRATIO 1.8 05/24/2019    GLOB 2.5 05/24/2019     Lab Results   Component Value Date    CHOL 158 06/11/2018    CHOL 191 04/01/2017    CHOL 177 12/30/2015     Lab Results   Component Value Date    TRIG 187 (H) 06/11/2018    TRIG 243 (H) 04/01/2017    TRIG 241 (H) 12/30/2015     Lab Results   Component Value Date    HDL 40 05/24/2019    HDL 42 06/11/2018    HDL 39 (L) 04/01/2017     Lab Results   Component Value Date    LDLCALC 114 (H) 05/24/2019    LDLCALC 79 06/11/2018    LDLCALC 103 (H) 04/01/2017     Lab Results   Component Value Date    LABA1C 5.6 09/17/2019     Lab Results   Component Value Date    TSH 0.98 03/02/2012    TSHHS 3.290 12/30/2015         ASSESSMENT/PLAN:      1. Onychomycosis of toenail  - ciclopirox (PENLAC) 8 % solution; Apply topically nightly. Paint on to infected toe nails. Dispense: 1 Bottle; Refill: 0    2. Tinea corporis  - ketoconazole (NIZORAL) 2 % cream; Apply topically 2 times daily X 10 to 14 days  Dispense: 30 g; Refill: 2    3. Callus of foot  Offered to refer to podiatry.   Patient refused referral at this time.          There are no discontinued medications. No orders of the defined types were placed in this encounter. Care discussed with patient. Questions answered. Patient verbalizes understanding and agrees with plan. After visit summary provided. Advised to call for any problems, questions, or concerns. Return if symptoms worsen or fail to improve.                                              Signed:  SHAHRIAR England CNP  07/15/20  7:31 PM

## 2020-07-15 ASSESSMENT — ENCOUNTER SYMPTOMS
NAUSEA: 0
CHEST TIGHTNESS: 0
WHEEZING: 0

## 2020-09-01 ENCOUNTER — EMPLOYEE WELLNESS (OUTPATIENT)
Dept: OTHER | Age: 45
End: 2020-09-01

## 2020-09-01 LAB
CHOLESTEROL: 149 MG/DL
GLUCOSE BLD-MCNC: 95 MG/DL (ref 70–99)
HDLC SERPL-MCNC: 37 MG/DL
LDL CHOLESTEROL CALCULATED: 89 MG/DL
PATIENT FASTING?: YES
TRIGL SERPL-MCNC: 115 MG/DL

## 2020-10-19 VITALS — BODY MASS INDEX: 30.54 KG/M2 | WEIGHT: 167 LBS

## 2020-11-06 ENCOUNTER — HOSPITAL ENCOUNTER (OUTPATIENT)
Dept: WOMENS IMAGING | Age: 45
Discharge: HOME | End: 2020-11-06
Payer: COMMERCIAL

## 2020-11-06 PROCEDURE — 77063 BREAST TOMOSYNTHESIS BI: CPT

## 2021-01-12 ENCOUNTER — E-VISIT (OUTPATIENT)
Dept: INTERNAL MEDICINE CLINIC | Age: 46
End: 2021-01-12
Payer: COMMERCIAL

## 2021-01-12 DIAGNOSIS — J06.9 VIRAL URI WITH COUGH: Primary | ICD-10-CM

## 2021-01-12 PROCEDURE — 99421 OL DIG E/M SVC 5-10 MIN: CPT | Performed by: INTERNAL MEDICINE

## 2021-01-12 ASSESSMENT — LIFESTYLE VARIABLES: SMOKING_STATUS: NO, I'VE NEVER SMOKED

## 2021-01-13 NOTE — PROGRESS NOTES
5-10 minutes were spent on the digital evaluation and management of this patient. Diagnoses and all orders for this visit:    Viral URI with cough     It sounds like you may have a virus and antibiotic will not help. Make sure you get plenty of rest and keep yourself hydrated. Buy some over the counter Mucinex DM 1200 mg 1 pill twice a day to help with congestion and cough by thinning up your secretions while your body works on fighting off the viral infection. Use your albuterol inhaler every 4-6 hours to help with your cough from bronchospasm triggered by the viral infection. Use the inhaler around the clock until your cough is completely gone. If not better, please schedule for an in person or a virtual video visit instead of an Evisit so you can interact with your provider to better diagnose and treat your symptoms.

## 2021-05-27 ENCOUNTER — OFFICE VISIT (OUTPATIENT)
Dept: FAMILY MEDICINE CLINIC | Age: 46
End: 2021-05-27
Payer: COMMERCIAL

## 2021-05-27 VITALS
WEIGHT: 179.8 LBS | RESPIRATION RATE: 14 BRPM | DIASTOLIC BLOOD PRESSURE: 80 MMHG | SYSTOLIC BLOOD PRESSURE: 132 MMHG | BODY MASS INDEX: 33.09 KG/M2 | HEIGHT: 62 IN | HEART RATE: 77 BPM | OXYGEN SATURATION: 96 %

## 2021-05-27 DIAGNOSIS — Z00.00 WELL ADULT EXAM: Primary | ICD-10-CM

## 2021-05-27 DIAGNOSIS — R73.9 HYPERGLYCEMIA: ICD-10-CM

## 2021-05-27 DIAGNOSIS — Z87.09 HISTORY OF ASTHMA: ICD-10-CM

## 2021-05-27 DIAGNOSIS — E78.1 HYPERTRIGLYCERIDEMIA: ICD-10-CM

## 2021-05-27 DIAGNOSIS — R05.9 COUGH: ICD-10-CM

## 2021-05-27 PROCEDURE — 99396 PREV VISIT EST AGE 40-64: CPT | Performed by: FAMILY MEDICINE

## 2021-05-27 RX ORDER — ALBUTEROL SULFATE 90 UG/1
2 AEROSOL, METERED RESPIRATORY (INHALATION) 4 TIMES DAILY PRN
Qty: 2 INHALER | Refills: 5 | Status: SHIPPED | OUTPATIENT
Start: 2021-05-27

## 2021-05-27 ASSESSMENT — PATIENT HEALTH QUESTIONNAIRE - PHQ9
SUM OF ALL RESPONSES TO PHQ9 QUESTIONS 1 & 2: 0
SUM OF ALL RESPONSES TO PHQ QUESTIONS 1-9: 0
1. LITTLE INTEREST OR PLEASURE IN DOING THINGS: 0
2. FEELING DOWN, DEPRESSED OR HOPELESS: 0
SUM OF ALL RESPONSES TO PHQ QUESTIONS 1-9: 0
SUM OF ALL RESPONSES TO PHQ QUESTIONS 1-9: 0

## 2021-05-27 NOTE — PROGRESS NOTES
Chief Complaint   Patient presents with    Annual Exam     requesting annual check on asthma and review multiple issues and HM needs       [place] NARRATIVE:    Check on asthma. With new cough, no purulence nor drainage. Patient is established unless otherwise noted. Above chief complaint and [place] obtained by this physician provider. Review of Systems   Constitutional: Negative for activity change, chills, fatigue and fever. HENT: Negative for congestion. Eyes: Negative for redness. Respiratory: Positive for cough and shortness of breath (mild). Negative for chest tightness and wheezing. Cardiovascular: Negative for chest pain and leg swelling. Gastrointestinal: Negative for abdominal pain. Musculoskeletal: Negative for back pain and myalgias. Skin: Negative for rash. Psychiatric/Behavioral: Negative for behavioral problems and dysphoric mood. No Known Allergies  Allergy historyupdated. Outpatient Medications Marked as Taking for the 5/27/21 encounter (Office Visit) with Merry Ramirez MD   Medication Sig Dispense Refill    albuterol sulfate HFA (VENTOLIN HFA) 108 (90 Base) MCG/ACT inhaler Inhale 2 puffs into the lungs 4 times daily as needed for Wheezing 2 Inhaler 5    ciclopirox (PENLAC) 8 % solution Apply topically nightly. Paint on to infected toe nails.  1 Bottle 0    ketoconazole (NIZORAL) 2 % cream Apply topically 2 times daily X 10 to 14 days 30 g 2    albuterol (PROVENTIL) (2.5 MG/3ML) 0.083% nebulizer solution Take 3 mLs by nebulization every 6 hours as needed for Wheezing 360 each 3    azelastine (ASTELIN) 0.1 % nasal spray 1 spray by Nasal route 2 times daily Use in each nostril as directed 1 Bottle 0    Cetirizine HCl (ZYRTEC ALLERGY PO) Take 1 tablet by mouth as needed       DiphenhydrAMINE HCl (BENADRYL ALLERGY PO) Take 1 tablet by mouth as needed      acetaminophen (TYLENOL) 500 MG tablet Take 500 mg by mouth every 6 hours as needed for Pain      melatonin 3 MG TABS tablet Take 5 mg by mouth daily      albuterol sulfate  (90 Base) MCG/ACT inhaler Inhale 2 puffs into the lungs every 6 hours as needed for Wheezing         Tobacco use history updated. Social History     Tobacco Use   Smoking Status Never Smoker   Smokeless Tobacco Never Used        Nursing note reviewed. Vitals:    05/27/21 0843   BP: 132/80   Site: Left Upper Arm   Position: Sitting   Cuff Size: Medium Adult   Pulse: 77   Resp: 14   SpO2: 96%   Weight: 179 lb 12.8 oz (81.6 kg)   Height: 5' 2\" (1.575 m)          BP Readings from Last 3 Encounters:   05/27/21 132/80   07/11/20 106/74   09/17/19 118/80     Wt Readings from Last 3 Encounters:   05/27/21 179 lb 12.8 oz (81.6 kg)   09/01/20 167 lb (75.8 kg)   07/11/20 170 lb (77.1 kg)     Body mass index is 32.89 kg/m². No results found for this visit on 05/27/21. Physical Exam  Vitals and nursing note reviewed. Constitutional:       General: She is not in acute distress. Appearance: She is well-developed. She is not diaphoretic. HENT:      Head: Normocephalic. Eyes:      General:         Right eye: No discharge. Left eye: No discharge. Conjunctiva/sclera: Conjunctivae normal.      Pupils: Pupils are equal, round, and reactive to light. Cardiovascular:      Rate and Rhythm: Normal rate and regular rhythm. Heart sounds: Normal heart sounds. No murmur heard. Pulmonary:      Effort: Pulmonary effort is normal. No respiratory distress. Breath sounds: Normal breath sounds. No wheezing or rales. Musculoskeletal:      Cervical back: Normal range of motion. Skin:     General: Skin is warm and dry. Neurological:      Mental Status: She is alert and oriented to person, place, and time. Psychiatric:         Behavior: Behavior normal.           _________________________________________________  Assessment:     1. Well adult exam  2.  Cough  -     albuterol sulfate HFA (VENTOLIN HFA) 108 (90 Base) MCG/ACT inhaler; Inhale 2 puffs into the lungs 4 times daily as needed for Wheezing, Disp-2 Inhaler, R-5Normal  3. History of asthma  -     albuterol sulfate HFA (VENTOLIN HFA) 108 (90 Base) MCG/ACT inhaler; Inhale 2 puffs into the lungs 4 times daily as needed for Wheezing, Disp-2 Inhaler, R-5Normal  4. Hyperglycemia  5. Hypertriglyceridemia    Med refilled as above. No atb needed. Albuterol rescue only and we will monitor to see if needs maintenance inhaler. Consider bw, no fasting BW done in 5/2019. Problems listed above are stable and therapeutic plan is unchanged unless otherwise specified. See orders above and comments below for details of workup or medication orders. _________________________________________________  Plan:     Return in about 1 year (around 5/27/2022), or if symptoms worsen or fail to improve.       Electronically signed by Marlene Cruz MD on 5/27/21 at 9:03 AM EDT

## 2021-06-01 ENCOUNTER — NURSE ONLY (OUTPATIENT)
Dept: FAMILY MEDICINE CLINIC | Age: 46
End: 2021-06-01
Payer: COMMERCIAL

## 2021-06-01 DIAGNOSIS — Z11.1 SCREENING FOR TUBERCULOSIS: Primary | ICD-10-CM

## 2021-06-01 PROCEDURE — 86580 TB INTRADERMAL TEST: CPT | Performed by: NURSE PRACTITIONER

## 2021-06-03 ENCOUNTER — NURSE ONLY (OUTPATIENT)
Dept: FAMILY MEDICINE CLINIC | Age: 46
End: 2021-06-03

## 2021-06-03 DIAGNOSIS — Z11.1 PPD SCREENING TEST: Primary | ICD-10-CM

## 2021-06-03 LAB
INDURATION: NORMAL
TB SKIN TEST: NEGATIVE

## 2021-06-08 ENCOUNTER — NURSE ONLY (OUTPATIENT)
Dept: FAMILY MEDICINE CLINIC | Age: 46
End: 2021-06-08
Payer: COMMERCIAL

## 2021-06-08 DIAGNOSIS — Z11.1 TUBERCULOSIS SCREENING: Primary | ICD-10-CM

## 2021-06-08 PROCEDURE — 86580 TB INTRADERMAL TEST: CPT | Performed by: NURSE PRACTITIONER

## 2021-06-10 ENCOUNTER — NURSE ONLY (OUTPATIENT)
Dept: FAMILY MEDICINE CLINIC | Age: 46
End: 2021-06-10

## 2021-06-10 DIAGNOSIS — Z11.1 ENCOUNTER FOR PPD SKIN TEST READING: Primary | ICD-10-CM

## 2021-06-10 LAB
INDURATION: NORMAL
TB SKIN TEST: NEGATIVE

## 2021-06-27 ASSESSMENT — ENCOUNTER SYMPTOMS
BACK PAIN: 0
SHORTNESS OF BREATH: 1
COUGH: 1
EYE REDNESS: 0
WHEEZING: 0
CHEST TIGHTNESS: 0
ABDOMINAL PAIN: 0

## 2021-09-03 LAB
CHOLESTEROL: 175 MG/DL
GLUCOSE BLD-MCNC: 98 MG/DL (ref 70–99)
HDLC SERPL-MCNC: 44 MG/DL
LDL CHOLESTEROL CALCULATED: 110 MG/DL
PATIENT FASTING?: YES
TRIGL SERPL-MCNC: 104 MG/DL

## 2021-09-20 ENCOUNTER — E-VISIT (OUTPATIENT)
Dept: FAMILY MEDICINE CLINIC | Age: 46
End: 2021-09-20
Payer: COMMERCIAL

## 2021-09-20 DIAGNOSIS — R05.9 COUGH: Primary | ICD-10-CM

## 2021-09-20 DIAGNOSIS — J30.9 ALLERGIC RHINITIS, UNSPECIFIED SEASONALITY, UNSPECIFIED TRIGGER: ICD-10-CM

## 2021-09-20 PROCEDURE — 99421 OL DIG E/M SVC 5-10 MIN: CPT | Performed by: FAMILY MEDICINE

## 2021-09-21 RX ORDER — AZITHROMYCIN 250 MG/1
250 TABLET, FILM COATED ORAL SEE ADMIN INSTRUCTIONS
Qty: 6 TABLET | Refills: 0 | Status: SHIPPED | OUTPATIENT
Start: 2021-09-21 | End: 2021-09-26

## 2021-09-21 RX ORDER — PROMETHAZINE HYDROCHLORIDE AND CODEINE PHOSPHATE 6.25; 1 MG/5ML; MG/5ML
5 SYRUP ORAL EVERY 4 HOURS PRN
Qty: 180 ML | Refills: 0 | Status: SHIPPED | OUTPATIENT
Start: 2021-09-21 | End: 2021-09-27

## 2021-09-21 RX ORDER — MONTELUKAST SODIUM 10 MG/1
10 TABLET ORAL NIGHTLY
Qty: 30 TABLET | Refills: 5 | Status: SHIPPED | OUTPATIENT
Start: 2021-09-21 | End: 2022-01-06

## 2021-09-21 ASSESSMENT — LIFESTYLE VARIABLES: SMOKING_STATUS: NO, I'VE NEVER SMOKED

## 2021-09-21 NOTE — PROGRESS NOTES
Patient submitted an E visit for sinusitis. She reports coughing at night a little bit of mucus clear or white and burning in her eyes. She has not had fevers she is not a smoker. Symptoms of lasted about 4 days. She does not have nasal congestion. She has used allergy medicine cough syrup and a Z-Boubacar in the past she is requesting a trial of Singulair, codeine cough syrup and a Z-Boubacar. These will be provided to her. 5-10 minutes were spent on the digital evaluation and management of this patient.

## 2021-10-25 ENCOUNTER — E-VISIT (OUTPATIENT)
Dept: FAMILY MEDICINE CLINIC | Age: 46
End: 2021-10-25
Payer: COMMERCIAL

## 2021-10-25 DIAGNOSIS — N39.0 ACUTE UTI: ICD-10-CM

## 2021-10-25 PROCEDURE — 99421 OL DIG E/M SVC 5-10 MIN: CPT | Performed by: FAMILY MEDICINE

## 2021-10-25 RX ORDER — SULFAMETHOXAZOLE AND TRIMETHOPRIM 800; 160 MG/1; MG/1
1 TABLET ORAL 2 TIMES DAILY
Qty: 10 TABLET | Refills: 0 | Status: SHIPPED | OUTPATIENT
Start: 2021-10-25 | End: 2021-10-30

## 2021-10-25 NOTE — PROGRESS NOTES
Patient submitted an E visit request for dysuria and pressure and frequency. She has been urinating less than normal but much more frequently than normal.  Urine is cloudy and symptoms have been going on for 1 to 2 weeks. She is sexually active with one long-term partner but not for the last few days. Symptoms are getting worse and she has had previous episodes. She usually does well with antibiotic therapy. She has no known drug allergies. Last treatment was with Bactrim in 2018. I will send this and it looks like it is defaulting to [facility] so I will send it to that pharmacy and notify patient. 5-10 minutes were spent on the digital evaluation and management of this patient.

## 2021-12-14 DIAGNOSIS — B35.4 TINEA CORPORIS: ICD-10-CM

## 2021-12-14 DIAGNOSIS — B35.1 ONYCHOMYCOSIS OF TOENAIL: ICD-10-CM

## 2021-12-14 RX ORDER — KETOCONAZOLE 20 MG/G
CREAM TOPICAL 2 TIMES DAILY
Qty: 30 G | Refills: 2 | Status: SHIPPED | OUTPATIENT
Start: 2021-12-14

## 2022-01-06 RX ORDER — MONTELUKAST SODIUM 10 MG/1
TABLET ORAL
Qty: 30 TABLET | Refills: 5 | Status: SHIPPED | OUTPATIENT
Start: 2022-01-06

## 2022-01-17 ENCOUNTER — HOSPITAL ENCOUNTER (EMERGENCY)
Age: 47
Discharge: HOME | End: 2022-01-17
Payer: COMMERCIAL

## 2022-01-17 VITALS
TEMPERATURE: 98.5 F | DIASTOLIC BLOOD PRESSURE: 97 MMHG | HEART RATE: 100 BPM | RESPIRATION RATE: 17 BRPM | SYSTOLIC BLOOD PRESSURE: 167 MMHG | OXYGEN SATURATION: 96 %

## 2022-01-17 DIAGNOSIS — R05.9 COUGH: Primary | ICD-10-CM

## 2022-01-17 DIAGNOSIS — R03.0 ELEVATED BLOOD PRESSURE READING: ICD-10-CM

## 2022-01-17 DIAGNOSIS — J06.9 ACUTE UPPER RESPIRATORY INFECTION: ICD-10-CM

## 2022-01-17 PROCEDURE — U0003 INFECTIOUS AGENT DETECTION BY NUCLEIC ACID (DNA OR RNA); SEVERE ACUTE RESPIRATORY SYNDROME CORONAVIRUS 2 (SARS-COV-2) (CORONAVIRUS DISEASE [COVID-19]), AMPLIFIED PROBE TECHNIQUE, MAKING USE OF HIGH THROUGHPUT TECHNOLOGIES AS DESCRIBED BY CMS-2020-01-R: HCPCS

## 2022-01-17 PROCEDURE — 99282 EMERGENCY DEPT VISIT SF MDM: CPT

## 2022-01-17 PROCEDURE — U0005 INFEC AGEN DETEC AMPLI PROBE: HCPCS

## 2022-01-17 RX ORDER — ONDANSETRON 4 MG/1
4 TABLET, ORALLY DISINTEGRATING ORAL EVERY 8 HOURS PRN
Qty: 15 TABLET | Refills: 0 | Status: SHIPPED | OUTPATIENT
Start: 2022-01-17

## 2022-01-17 ASSESSMENT — PAIN SCALES - GENERAL: PAINLEVEL_OUTOF10: 6

## 2022-01-17 ASSESSMENT — PAIN DESCRIPTION - LOCATION: LOCATION: THROAT

## 2022-01-17 NOTE — ED PROVIDER NOTES
José GALICIA Mann 89 ENCOUNTER      PCP: Breana Montemayor MD    [address] [facility]    Chief Complaint   Patient presents with    Concern For COVID-19     cough, fever, headache, emesis, diarrhea     This patient was not evaluated by the attending physician. I have independently evaluated this patient. HPI    Elly Earl is a 55 y.o. female who presents with cough, nasal congestion, fever. Patient has associated nausea, and diarrhea. Patient denies chest pain or shortness of breath. Patient has family member with similar symptoms. Patient is vaccinated for COVID. Patient has associated sore throat. No known alleviating factors. REVIEW OF SYSTEMS    Constitutional: See HPI  HEENT:  See above  Cardiovascular:  Denies chest pain  Respiratory:  + cough Denies wheezes or shortness of breath   GI:  Denies abdominal pain, vomiting, or diarrhea   :  Denies any urinary symptoms   Neurologic:  Denies syncope       All other review of systems are negative  See HPI and nursing notes for additional information       PAST MEDICAL AND SURGICAL HISTORY    Past Medical History:   Diagnosis Date    Asthma     last flare up 2016- mostly with exertion    Hypertriglyceridemia 9/17/2019     Past Surgical History:   Procedure Laterality Date    CHOLECYSTECTOMY, LAPAROSCOPIC  08/09/2017    TONSILLECTOMY  age 11   Leroyvifadi Narwalter WISDOM TOOTH EXTRACTION  17 yrs ago       CURRENT MEDICATIONS    Current Outpatient Rx   Medication Sig Dispense Refill    ondansetron (ZOFRAN ODT) 4 MG disintegrating tablet Take 1 tablet by mouth every 8 hours as needed for Nausea or Vomiting 15 tablet 0    montelukast (SINGULAIR) 10 MG tablet TAKE ONE TABLET BY MOUTH NIGHTLY 30 tablet 5    ciclopirox (PENLAC) 8 % solution Apply topically nightly. Paint on to infected toe nails.  6 mL 5    ketoconazole (NIZORAL) 2 % cream Apply topically 2 times daily X 10 to 14 days 30 g 2    albuterol sulfate HFA (VENTOLIN HFA) 108 (90 Base) MCG/ACT inhaler Inhale 2 puffs into the lungs 4 times daily as needed for Wheezing 2 Inhaler 5    albuterol (PROVENTIL) (2.5 MG/3ML) 0.083% nebulizer solution Take 3 mLs by nebulization every 6 hours as needed for Wheezing 360 each 3    azelastine (ASTELIN) 0.1 % nasal spray 1 spray by Nasal route 2 times daily Use in each nostril as directed 1 Bottle 0    Cetirizine HCl (ZYRTEC ALLERGY PO) Take 1 tablet by mouth as needed       DiphenhydrAMINE HCl (BENADRYL ALLERGY PO) Take 1 tablet by mouth as needed      acetaminophen (TYLENOL) 500 MG tablet Take 500 mg by mouth every 6 hours as needed for Pain      melatonin 3 MG TABS tablet Take 5 mg by mouth daily         ALLERGIES    No Known Allergies    FAMILY AND SOCIAL HISTORY    Family History   Problem Relation Age of Onset    Diabetes Mother     High Blood Pressure Mother     High Cholesterol Mother     High Blood Pressure Father     Diabetes Father     High Cholesterol Father      Social History     Socioeconomic History    Marital status:      Spouse name: None    Number of children: None    Years of education: None    Highest education level: None   Occupational History    None   Tobacco Use    Smoking status: Never Smoker    Smokeless tobacco: Never Used   Vaping Use    Vaping Use: Never used   Substance and Sexual Activity    Alcohol use: No    Drug use: No    Sexual activity: Yes     Partners: Male     Comment:    Other Topics Concern    None   Social History Narrative    None     Social Determinants of Health     Financial Resource Strain:     Difficulty of Paying Living Expenses: Not on file   Food Insecurity:     Worried About Running Out of Food in the Last Year: Not on file    Johann of Food in the Last Year: Not on file   Transportation Needs:     Lack of Transportation (Medical): Not on file    Lack of Transportation (Non-Medical):  Not on file   Physical Activity:     Days of Exercise per Week: Not on file    Minutes of Exercise per bilaterally. Oropharynx is mildly erythematous without swelling or exudates. No palp lymphadenopathy. I suspect patient's symptoms are viral in nature. Patient is swabbed for COVID. Recommend symptomatic treatment increase rest, fluids, ibuprofen and Tylenol. Patient provided prescription for Zofran. I recommend self quarantine until COVID test results come back. Recommend follow-up with primary care provider in 2 to 3 days for recheck. Clinical  IMPRESSION    1. Cough    2. Acute upper respiratory infection    3. Elevated blood pressure reading          Diagnosis and plan discussed in detail with patient who understands and agrees. Patient agrees to return emergency department if symptoms worsen or any new symptoms develop. Comment: Please note this report has been produced using speech recognition software and may contain errors related to that system including errors in grammar, punctuation, and spelling, as well as words and phrases that may be inappropriate. If there are any questions or concerns please feel free to contact the dictating provider for clarification.       Daniela Chiang PA-C  01/17/22 [id number]

## 2022-01-18 ENCOUNTER — E-VISIT (OUTPATIENT)
Dept: FAMILY MEDICINE CLINIC | Age: 47
End: 2022-01-18
Payer: COMMERCIAL

## 2022-01-18 ENCOUNTER — CARE COORDINATION (OUTPATIENT)
Dept: OTHER | Facility: CLINIC | Age: 47
End: 2022-01-18

## 2022-01-18 DIAGNOSIS — R05.9 COUGH: ICD-10-CM

## 2022-01-18 DIAGNOSIS — U07.1 COVID-19: Primary | ICD-10-CM

## 2022-01-18 LAB
SARS-COV-2: DETECTED
SOURCE: ABNORMAL

## 2022-01-18 PROCEDURE — 99421 OL DIG E/M SVC 5-10 MIN: CPT | Performed by: FAMILY MEDICINE

## 2022-01-18 RX ORDER — DEXTROMETHORPHAN POLISTIREX 30 MG/5ML
60 SUSPENSION ORAL 2 TIMES DAILY PRN
COMMUNITY

## 2022-01-18 NOTE — CARE COORDINATION
Date/Time:  1/18/2022 1:17 PM  Attempted to reach patient by telephone. Call within 2 business days of discharge: Yes Left HIPPA compliant message requesting a return call. [employer] Message sent also. Will attempt to reach patient again.

## 2022-01-18 NOTE — CARE COORDINATION
Ambulatory Care Coordination Note  2022  CM Risk Score: 1  Charlson 10 Year Mortality Risk Score: 2%     ACC: Payal Andrews, RN    Summary Note: Patient contacted regarding COVID-19 diagnosis. Discussed COVID-19 related testing which was available at this time. Test results were positive. Patient informed of results, if available? Yes. Ambulatory Care Manager contacted the patient by telephone to perform post discharge assessment. Call within 2 business days of discharge: Yes. Verified name and  with patient as identifiers. Provided introduction to self, and explanation of the CTN/ACM role, and reason for call due to risk factors for infection and/or exposure to COVID-19. Symptoms reviewed with patient who verbalized the following symptoms: fever, cough, nausea, diarrhea, no new symptoms, no worsening symptoms and headache, sore throat. Vomiting has subsided. Vel Him states she also has a lack of apatite and difficulty with some foods due to sore throat. She is drinking Gatorade to aid in hydration and water. ACM advised drinking/eating soups, yogurt, mashed potato's and other small frequent meal/snacks as tolerated. Patient is managing symptoms with OTC medications such as tylenol, throat lozenges, and delsym. Due to no new or worsening symptoms encounter was not routed to provider for escalation. Discussed follow-up appointments. If no appointment was previously scheduled, appointment scheduling offered: Yes. ACM strongly encouraged to make a VV appt now for Friday or Monday. [facility] follow up appointment(s): No future appointments.   Non-[facility] follow up appointment(s): n/a    Non-face-to-face services provided:  Obtained and reviewed discharge summary and/or continuity of care documents  Education of patient/family/caregiver/guardian to support self-management-r/t COVID 19 symptom management, secondary infection preventon  Assessment and support for treatment adherence and medication management-meds reviewed, denies questions  COVID resource letter provided     Advance Care Planning:   Does patient have an Advance Directive:  not on file. Educated patient about risk for severe COVID-19 due to risk factors according to CDC guidelines. ACM reviewed discharge instructions, medical action plan and red flag symptoms with the patient who verbalized understanding. Discussed COVID vaccination status: Yes. Education provided on COVID-19 vaccination as appropriate. Discussed exposure protocols and quarantine with CDC Guidelines. Patient was given an opportunity to verbalize any questions and concerns and agrees to contact ACM or health care provider for questions related to their healthcare. Reviewed and educated patient on any new and changed medications related to discharge diagnosis     Was patient discharged with a pulse oximeter? No      ACM provided contact information. Plan for follow-up call in 3-5 days based on severity of symptoms and risk factors. Prior to Admission medications    Medication Sig Start Date End Date Taking? Authorizing Provider   ondansetron (ZOFRAN ODT) 4 MG disintegrating tablet Take 1 tablet by mouth every 8 hours as needed for Nausea or Vomiting 1/17/22  Yes Jesenia Vargas PA-C   albuterol sulfate HFA (VENTOLIN HFA) 108 (90 Base) MCG/ACT inhaler Inhale 2 puffs into the lungs 4 times daily as needed for Wheezing 5/27/21  Yes Ken Bird MD   albuterol (PROVENTIL) (2.5 MG/3ML) 0.083% nebulizer solution Take 3 mLs by nebulization every 6 hours as needed for Wheezing 4/21/20  Yes Ken Bird MD   acetaminophen (TYLENOL) 500 MG tablet Take 500 mg by mouth every 6 hours as needed for Pain   Yes Historical Provider, MD   montelukast (SINGULAIR) 10 MG tablet TAKE ONE TABLET BY MOUTH NIGHTLY 1/6/22   Ken Bird MD   ciclopirox (PENLAC) 8 % solution Apply topically nightly. Paint on to infected toe nails.  12/14/21   Ken Bird MD   ketoconazole (NIZORAL) 2 % cream Apply topically 2 times daily X 10 to 14 days 12/14/21   Jassi Dan MD   azelastine (ASTELIN) 0.1 % nasal spray 1 spray by Nasal route 2 times daily Use in each nostril as directed 9/7/18   SHAHRIAR Langston - CNP   Cetirizine HCl (ZYRTEC ALLERGY PO) Take 1 tablet by mouth as needed     Historical Provider, MD   DiphenhydrAMINE HCl (BENADRYL ALLERGY PO) Take 1 tablet by mouth as needed    Historical Provider, MD   melatonin 3 MG TABS tablet Take 5 mg by mouth daily    Historical Provider, MD       No future appointments.

## 2022-01-19 ASSESSMENT — LIFESTYLE VARIABLES: SMOKING_STATUS: NO, I HAVE NEVER SMOKED

## 2022-01-19 NOTE — PROGRESS NOTES
Patient submitted E-visit this afternoon stating that she tested positive for COVID-19 on 1/17/2022. She reports fever chills nausea vomiting diarrhea, headache, cough and sinus drainage. Symptom onset date was 1/15/2022. She has cough mostly at night. Mucus is thin and yellow or green. She also has pain when swallowing. Diarrhea and nausea and vomiting have improved. Overall symptoms are improving and she is using ibuprofen and Tylenol. She has no significant ongoing medical problems that would impact COVID and she did have 2 doses of Pfizer but did not have a booster dose yet. Last Pfizer dose was 1/18/2021 now just over 1 year ago. BMI was 32 in May 2021. I will send a my chart message to continue symptomatic treatment, she would not meet priority qualifications for molnupiravir nor antibody infusion. 5-10 minutes were spent on the digital evaluation and management of this patient.

## 2022-01-20 RX ORDER — PROMETHAZINE HYDROCHLORIDE AND CODEINE PHOSPHATE 6.25; 1 MG/5ML; MG/5ML
5 SYRUP ORAL EVERY 4 HOURS PRN
Qty: 118 ML | Refills: 0 | Status: SHIPPED | OUTPATIENT
Start: 2022-01-20 | End: 2022-01-24

## 2022-01-25 ENCOUNTER — CARE COORDINATION (OUTPATIENT)
Dept: OTHER | Facility: CLINIC | Age: 47
End: 2022-01-25

## 2022-01-28 ENCOUNTER — CARE COORDINATION (OUTPATIENT)
Dept: OTHER | Facility: CLINIC | Age: 47
End: 2022-01-28

## 2022-01-28 NOTE — CARE COORDINATION
[place] [address] Transitions Follow Up Call    2022    Patient: Joe Harmon  Patient : 1975   MRN: [id number]  Reason for Admission: Covid  Discharge Date: 22 RARS: No data recorded      Challenges to be reviewed by the provider   Additional needs identified to be addressed with provider: No  none             Method of communication with provider : none      Care Transition Nurse (CTN) contacted the patient by telephone to perform post hospital discharge assessment. Verified name and  with patient as identifiers. Provided introduction to self, and explanation of the CTN role. CTN reviewed discharge instructions, medical action plan and red flags with patient who verbalized understanding. Patient given an opportunity to ask questions and does not have any further questions or concerns at this time. Were discharge instructions available to patient? Yes. Reviewed appropriate site of care based on symptoms and resources available to patient including: PCP, Benefits related nurse triage line and When to call 911. The patient agrees to contact the PCP office for questions related to their healthcare. Medication reconciliation was performed with patient, who verbalizes understanding of administration of home medications. Advised obtaining a 90-day supply of all daily and as-needed medications. Reviewed and educated patient on any new and changed medications related to discharge diagnosis. Spoke to patient. She states her symptoms are starting to improve. Still c/o of a dry, lingering cough. O2 levels stay mid to upper 90's, denies dropping below 90%. Denies worsening shortness of breath. Appetite is WNL. She states she has been sleeping well since rx cough medicine with promethazine. During the day, she is taking OTC Delsym. Denies fevers. States energy levels are normal. 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     ACM discussed the following RED FLAGS and encouraged patient to contact 911 for life threatening emergencies and PCP office 24/7 for non life-threatening symptoms. ACM also encouraged outreach to Nurse Access Line and/or ACM for assessment and intervention guidance as needed. Reminded patient of alternatives to ED such as urgent care, walk in clinics and e-visits as available. ACM provided contact information. Plan for follow-up call in 5-7 days based on severity of symptoms and risk factors. Plan for next call: symptom management-cough  medication management-OTC cough medications      Joann Rogers, [address] Coordinator  Associate Care Management  [address] [place] [address]  Phone: [phone]  [email or web address]

## 2022-02-10 ENCOUNTER — CARE COORDINATION (OUTPATIENT)
Dept: OTHER | Facility: CLINIC | Age: 47
End: 2022-02-10

## 2022-02-10 NOTE — CARE COORDINATION
[facility] ED Follow Up Call    2/10/2022    Patient: Thiago oHdges Patient : 1975   MRN: [id number]  Reason for Admission: COVID 19  Discharge Date: 22    You Patient resolved from the Care Transitions episode on 2/10/22  Discussed COVID-19 related testing which was available at this time. Test results were positive. Patient informed of results, if available? No    Patient/family has been provided the following resources and education related to COVID-19:                         Signs, symptoms and red flags related to COVID-19            CDC exposure and quarantine guidelines            [employer] exposure contact - [phone]            Contact for their local Department of Health                 Patient currently reports that the following symptoms have improved:  nausea, vomiting, diarrhea, sore throat, and headache , fever and no new/worsening symptoms     Zoraida Lucero reports a lingering cough but that all other symptoms have resolved. She denies any additional questions, concerns, or needs related to COVID 19. She denies and additional CM needs. No further outreach scheduled with this CTN/ACM. Episode of Care resolved. Patient has this CTN/ACM contact information if future needs arise.

## 2022-06-30 ENCOUNTER — E-VISIT (OUTPATIENT)
Dept: PRIMARY CARE CLINIC | Age: 47
End: 2022-06-30
Payer: COMMERCIAL

## 2022-06-30 DIAGNOSIS — R30.0 DYSURIA: Primary | ICD-10-CM

## 2022-06-30 PROCEDURE — 99422 OL DIG E/M SVC 11-20 MIN: CPT | Performed by: NURSE PRACTITIONER

## 2022-06-30 RX ORDER — NITROFURANTOIN 25; 75 MG/1; MG/1
100 CAPSULE ORAL 2 TIMES DAILY
Qty: 14 CAPSULE | Refills: 0 | Status: SHIPPED | OUTPATIENT
Start: 2022-06-30 | End: 2022-07-07

## 2022-06-30 NOTE — PATIENT INSTRUCTIONS
Patient Education        Painful Urination (Dysuria): Care Instructions  Your Care Instructions  Burning pain with urination (dysuria) is a common symptom of a urinary tract infection or other urinary problems. The bladder may become inflamed. This can cause pain when the bladder fills and empties. You may also feel pain if the tube that carries urine from the bladder to the outside of the body (urethra)gets irritated or infected. Sexually transmitted infections (STIs) also may cause pain when you urinate. Sometimes the pain can be caused by things other than an infection. The urethra can be irritated by soaps, perfumes, or foreign objects in the urethra. [place] can cause pain when they pass through the urethra. The cause may be hard to find. You may need tests. Treatment for painfulurination depends on the cause. Follow-up care is a key part of your treatment and safety. Be sure to make and go to all appointments, and call your doctor if you are having problems. It's also a good idea to know your test results and keep alist of the medicines you take. How can you care for yourself at home?  Drink extra water for the next day or two. This will help make the urine less concentrated. (If you have kidney, heart, or liver disease and have to limit fluids, talk with your doctor before you increase the amount of fluids you drink.)   Avoid drinks that are carbonated or have caffeine. They can irritate the bladder.  Urinate often. Try to empty your bladder each time. For women:   Urinate right after you have sex.  After going to the bathroom, wipe from front to back.  Avoid douches, bubble baths, and feminine hygiene sprays. And avoid other feminine hygiene products that have deodorants. When should you call for help? Call your doctor now or seek immediate medical care if:     You have new symptoms, such as fever, nausea, or vomiting.      You have new or worse symptoms of a urinary problem.  For example:  ? You have blood or pus in your urine. ? You have chills or body aches. ? It hurts worse to urinate. ? You have groin or belly pain. ? You have pain in your back just below your rib cage (the flank area). Watch closely for changes in your health, and be sure to contact your doctor ifyou have any problems. Where can you learn more? Go to https://[employer] Servicespepiceweb.[employer]. org and sign in to your [employer] account. Enter J758 in the Clipsource box to learn more about \"Painful Urination (Dysuria): Care Instructions. \"     If you do not have an account, please click on the \"Sign Up Now\" link. Current as of: October 18, 2021               Content Version: 13.3  © 2648-3258 Healthwise, Incorporated. Care instructions adapted under license by [facility] ([facility]). If you have questions about a medical condition or this instruction, always ask your healthcare professional. [place] [address] any warranty or liability for your use of this information.

## 2022-06-30 NOTE — PROGRESS NOTES
Reviewed questionnaire  Reviewed previous encounters, labs, meds, allergies and history     Dx dysuria     Plan  rx for macrobid 100 mg bid x 7 days     Increase oral fluids. Tylenol or motrin for pain.   May take azo over the counter     Please f/u with pcp if symptoms do not improve for further evaluation and possible urine culture     See educational handout     Time spent 11 min

## 2022-07-20 ENCOUNTER — HOSPITAL ENCOUNTER (EMERGENCY)
Age: 47
Discharge: HOME | End: 2022-07-20
Attending: STUDENT IN AN ORGANIZED HEALTH CARE EDUCATION/TRAINING PROGRAM
Payer: COMMERCIAL

## 2022-07-20 VITALS
HEART RATE: 91 BPM | RESPIRATION RATE: 16 BRPM | DIASTOLIC BLOOD PRESSURE: 78 MMHG | TEMPERATURE: 98 F | OXYGEN SATURATION: 96 % | SYSTOLIC BLOOD PRESSURE: 111 MMHG
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
## 2022-07-20 DIAGNOSIS — S41.159A DOG BITE OF UPPER EXTREMITY, UNSPECIFIED LATERALITY, INITIAL ENCOUNTER: Primary | ICD-10-CM

## 2022-07-20 PROCEDURE — 90471 IMMUNIZATION ADMIN: CPT | Performed by: STUDENT IN AN ORGANIZED HEALTH CARE EDUCATION/TRAINING PROGRAM

## 2022-07-20 PROCEDURE — 6370000000 HC RX 637 (ALT 250 FOR IP): Performed by: STUDENT IN AN ORGANIZED HEALTH CARE EDUCATION/TRAINING PROGRAM

## 2022-07-20 PROCEDURE — 99284 EMERGENCY DEPT VISIT MOD MDM: CPT

## 2022-07-20 PROCEDURE — 90715 TDAP VACCINE 7 YRS/> IM: CPT | Performed by: STUDENT IN AN ORGANIZED HEALTH CARE EDUCATION/TRAINING PROGRAM

## 2022-07-20 PROCEDURE — 6360000002 HC RX W HCPCS: Performed by: STUDENT IN AN ORGANIZED HEALTH CARE EDUCATION/TRAINING PROGRAM

## 2022-07-20 RX ORDER — HYDROCODONE BITARTRATE AND ACETAMINOPHEN 5; 325 MG/1; MG/1
1 TABLET ORAL ONCE
Status: COMPLETED | OUTPATIENT
Start: 2022-07-20 | End: 2022-07-20

## 2022-07-20 RX ORDER — AMOXICILLIN AND CLAVULANATE POTASSIUM 875; 125 MG/1; MG/1
1 TABLET, FILM COATED ORAL ONCE
Status: COMPLETED | OUTPATIENT
Start: 2022-07-20 | End: 2022-07-20

## 2022-07-20 RX ORDER — AMOXICILLIN AND CLAVULANATE POTASSIUM 875; 125 MG/1; MG/1
1 TABLET, FILM COATED ORAL 2 TIMES DAILY
Qty: 20 TABLET | Refills: 0 | Status: SHIPPED | OUTPATIENT
Start: 2022-07-20 | End: 2022-07-30

## 2022-07-20 RX ADMIN — TETANUS TOXOID, REDUCED DIPHTHERIA TOXOID AND ACELLULAR PERTUSSIS VACCINE, ADSORBED 0.5 ML: 5; 2.5; 8; 8; 2.5 SUSPENSION INTRAMUSCULAR at 16:18

## 2022-07-20 RX ADMIN — AMOXICILLIN AND CLAVULANATE POTASSIUM 1 TABLET: 875; 125 TABLET, FILM COATED ORAL at 16:14

## 2022-07-20 RX ADMIN — HYDROCODONE BITARTRATE AND ACETAMINOPHEN 1 TABLET: 5; 325 TABLET ORAL at 16:15

## 2022-07-20 NOTE — ED NOTES
This RN cleaned wound of debris with NS and 2X2, left open for visualization by.  Dr Kentrell Bolaños, MAURA  07/20/22 5632

## 2022-07-20 NOTE — DISCHARGE INSTRUCTIONS
Return the emergency department for an evaluation for any signs of infection which include increasing redness, swelling, worsening pain, or purulent drainage. Follow-up with your primary care otherwise for a wound recheck.

## 2022-07-20 NOTE — ED NOTES
Patient to ER for c/o dog bite to Felicia and R hand. States the dog is not hers, but that she does know the dog and it is up to date on it's shots. She has not had a tetanus in the last 5 years. Puncture wounds noted.       Katherine Montemayor RN  07/20/22 2084

## 2022-07-20 NOTE — ED PROVIDER NOTES
[address] ENCOUNTER      Pt Name: Vijay Cortez  MRN: [id number]  [place] 1975  Date of evaluation: 7/20/2022  Provider: Nathalie Harmon MD    CHIEF COMPLAINT       Chief Complaint   Patient presents with    Animal Bite     Puncture wounds to left elbow and R hand. Dog is up to date on shots, tetanus not up to date       [facility]    Vijay Cortez is a 52 y.o. female Presenting for dog bite to left arm. Dog was her neighbors. Her their dog was fighting with her dog through the fence. She tried to break up the fight and was bitten on her left arm and on her right hand. 5 puncture marks on the left upper extremity and minor abrasions to the right hand. Mild amount of bruising to the left forearm. Has full range of motion no loss of sensation to her hands and arms bilaterally. Last tetanus was 7 years ago. Neighbors dog should have all vaccines up-to-date. Nursing Notes were reviewed. REVIEW OF SYSTEMS     Review of Systems  A 10 point review of system was performed and is otherwise negative apart from what is noted in HPI and nursing notes. As is my standard practice, all pertinent positives from the ROS are documented in the HPI.     PAST MEDICAL HISTORY     Past Medical History:   Diagnosis Date    Asthma     last flare up 2016- mostly with exertion    Hypertriglyceridemia 9/17/2019       SURGICAL HISTORY       Past Surgical History:   Procedure Laterality Date    CHOLECYSTECTOMY, LAPAROSCOPIC  08/09/2017    TONSILLECTOMY  age 11    WISDOM TOOTH EXTRACTION  17 yrs ago       CURRENT MEDICATIONS       Previous Medications    ACETAMINOPHEN (TYLENOL) 500 MG TABLET    Take 500 mg by mouth every 6 hours as needed for Pain    ALBUTEROL (PROVENTIL) (2.5 MG/3ML) 0.083% NEBULIZER SOLUTION    Take 3 mLs by nebulization every 6 hours as needed for Wheezing    ALBUTEROL SULFATE HFA (VENTOLIN HFA) 108 (90 BASE) HENT:      Head: Normocephalic. Eyes:      Extraocular Movements: Extraocular movements intact. Conjunctiva/sclera: Conjunctivae normal.      Pupils: Pupils are equal, round, and reactive to light. Cardiovascular:      Rate and Rhythm: Normal rate. Pulmonary:      Effort: Pulmonary effort is normal. No respiratory distress. Abdominal:      General: Abdomen is flat. Musculoskeletal:      Cervical back: Normal range of motion. Comments: 5 puncture wounds to left upper extremity just proximal to the elbow on the posterior lateral area of the arm. 1 large puncture wound and 4 very small. Largely hemostatic. Bruising to the left ulnar side of the forearm. No significant bony tenderness to the forearm, wrist, or humerus. Intact sensation and motor to the radial, ulnar, medial distributions of the left hand. 1+ radial pulse. On the right hand 2 small abrasions on the dorsum of the hand near the knuckles. Skin:     General: Skin is warm and dry. Neurological:      General: No focal deficit present. Mental Status: She is alert. Psychiatric:         Mood and Affect: Mood normal.         Behavior: Behavior normal.       DIAGNOSTIC RESULTS     EKG: All EKG's are interpreted by me in the absence of a cardiologist.      RADIOLOGY:   Interpretation per the Radiologist below, if available at the time of this note:    No orders to display       LABS:  No results found for this visit on 07/20/22. EMERGENCY DEPARTMENT COURSE        DIFFERENTIAL DIAGNOSIS/MDM:   Vitals:    Vitals:    07/20/22 1539   BP: 111/78   Pulse: 91   Resp: 16   Temp: 98 °F (36.7 °C)   TempSrc: Oral   SpO2: 96%       MDM  Number of Diagnoses or Management Options  Diagnosis management comments: Patient presenting for puncture wounds from dog bite. Areas are hemostatic. No evidence of any bony injury. Do not feel x-ray is warranted at this time.   Neurovascularly intact to both extremities on exam.  Wounds cleaned and dressed by nursing staff. Tetanus updated. Patient given pain control and Augmentin. Will be discharged with prescription for Augmentin and cellulitis precautions. CONSULTS:  None    PROCEDURES:  Unless otherwise noted below, none. Procedures      FINAL IMPRESSION      1. Dog bite of upper extremity, unspecified laterality, initial encounter          PATIENT REFERRED TO:  Luis Carlos Jasso MD  [address]  [address]   [phone]      For wound re-check    DISCHARGE MEDICATIONS:  New Prescriptions    AMOXICILLIN-CLAVULANATE (AUGMENTIN) 875-125 MG PER TABLET    Take 1 tablet by mouth in the morning and 1 tablet before bedtime. Do all this for 10 days. Controlled Substances Monitoring:     RX Monitoring 10/20/2018   Attestation The Prescription Monitoring Report for this patient was reviewed today.        (Please note that portions of this note were completed with a voice recognition program.  Efforts were made to edit the dictations but occasionally words are mis-transcribed.)    Concepcion Mehta MD (electronically signed)  Attending Emergency Physician            Concepcion Mehta MD  07/20/22 4515

## 2022-07-20 NOTE — ED NOTES
Wound cleaned with sterile water, nonstick bandage placed, secured with guaze and med tape      Dwight Schreiber RN  07/20/22 1359

## 2023-02-08 RX ORDER — MONTELUKAST SODIUM 10 MG/1
TABLET ORAL
Qty: 30 TABLET | Refills: 0 | Status: SHIPPED | OUTPATIENT
Start: 2023-02-08

## 2023-03-01 ENCOUNTER — E-VISIT (OUTPATIENT)
Dept: PRIMARY CARE CLINIC | Age: 48
End: 2023-03-01
Payer: COMMERCIAL

## 2023-03-01 DIAGNOSIS — R30.0 DYSURIA: Primary | ICD-10-CM

## 2023-03-01 PROCEDURE — 99422 OL DIG E/M SVC 11-20 MIN: CPT | Performed by: NURSE PRACTITIONER

## 2023-03-01 RX ORDER — CEPHALEXIN 500 MG/1
500 CAPSULE ORAL 2 TIMES DAILY
Qty: 14 CAPSULE | Refills: 0 | Status: SHIPPED | OUTPATIENT
Start: 2023-03-01 | End: 2023-03-08

## 2023-03-01 RX ORDER — CEPHALEXIN 500 MG/1
500 CAPSULE ORAL 2 TIMES DAILY
Qty: 14 CAPSULE | Refills: 0 | Status: SHIPPED | OUTPATIENT
Start: 2023-03-01 | End: 2023-03-01 | Stop reason: SDUPTHER

## 2023-03-01 NOTE — PROGRESS NOTES
Reviewed questionnaire     Reviewed meds/allergies    Dx Dysuria    Plan Rx given for keflex, follow up with PCP if no improvement    Time spent on visit 13 min

## 2023-05-25 ENCOUNTER — HOSPITAL ENCOUNTER (OUTPATIENT)
Dept: WOMENS IMAGING | Age: 48
Discharge: HOME | End: 2023-05-25
Payer: COMMERCIAL

## 2023-05-25 DIAGNOSIS — Z12.31 ENCOUNTER FOR SCREENING MAMMOGRAM FOR BREAST CANCER: ICD-10-CM

## 2023-05-25 PROCEDURE — 77063 BREAST TOMOSYNTHESIS BI: CPT

## 2023-07-03 ENCOUNTER — OFFICE VISIT (OUTPATIENT)
Dept: FAMILY MEDICINE CLINIC | Age: 48
End: 2023-07-03
Payer: COMMERCIAL

## 2023-07-03 VITALS
TEMPERATURE: 99.7 F | OXYGEN SATURATION: 96 % | HEART RATE: 94 BPM | WEIGHT: 172.8 LBS | HEIGHT: 61 IN | BODY MASS INDEX: 32.62 KG/M2

## 2023-07-03 DIAGNOSIS — J40 BRONCHITIS: Primary | ICD-10-CM

## 2023-07-03 PROCEDURE — 99213 OFFICE O/P EST LOW 20 MIN: CPT | Performed by: NURSE PRACTITIONER

## 2023-07-03 RX ORDER — METHYLPREDNISOLONE 4 MG/1
TABLET ORAL
Qty: 1 KIT | Refills: 0 | Status: SHIPPED | OUTPATIENT
Start: 2023-07-03 | End: 2023-07-09

## 2023-07-03 RX ORDER — AZITHROMYCIN 250 MG/1
TABLET, FILM COATED ORAL
Qty: 1 PACKET | Refills: 0 | Status: SHIPPED | OUTPATIENT
Start: 2023-07-03

## 2023-07-03 RX ORDER — DEXTROMETHORPHAN HYDROBROMIDE AND PROMETHAZINE HYDROCHLORIDE 15; 6.25 MG/5ML; MG/5ML
5 SYRUP ORAL EVERY 6 HOURS PRN
Qty: 118 ML | Refills: 0 | Status: SHIPPED | OUTPATIENT
Start: 2023-07-03 | End: 2023-07-10

## 2023-07-03 NOTE — PROGRESS NOTES
7/3/2023    HPI:  Chief complaint and history of present illness as per medical assistant/nurse documented today in the Flu/COVID-19 clinic. Patient is here with complaints of chills, cough, chest/nasal congestion, chest tightness, headache, vomiting - last night, and fatigue x 2 days. Patient states she has asthma. Patient states she has had the covid vaccine. Patient states she took an at home covid test this morning and it was negative. Patient states she feels as if a medrol dose pack will help. MEDICATIONS:  Prior to Visit Medications    Medication Sig Taking? Authorizing Provider   azithromycin (ZITHROMAX) 250 MG tablet Take 2 tabs (500 mg) on Day 1, and take 1 tab (250 mg) on days 2 through 5. Yes SHAHRIAR Jean-Baptiste CNP   methylPREDNISolone (MEDROL, MAYE,) 4 MG tablet Take by mouth. Yes SHAHRIAR Jean-Baptiste CNP   promethazine-dextromethorphan (PROMETHAZINE-DM) 6.25-15 MG/5ML syrup Take 5 mLs by mouth every 6 hours as needed for Cough Yes SHAHRIAR Jean-Baptiste CNP   montelukast (SINGULAIR) 10 MG tablet TAKE ONE TABLET BY MOUTH NIGHTLY  Kendrick Beach MD   dextromethorphan (DELSYM) 30 MG/5ML extended release liquid Take 60 mg by mouth 2 times daily as needed for Cough For use with COVID 19  Historical Provider, MD   ondansetron (ZOFRAN ODT) 4 MG disintegrating tablet Take 1 tablet by mouth every 8 hours as needed for Nausea or Vomiting  Ania Jeff PA-C   ciclopirox (PENLAC) 8 % solution Apply topically nightly. Paint on to infected toe nails.   Kendrick Beach MD   ketoconazole (NIZORAL) 2 % cream Apply topically 2 times daily X 10 to 14 days  Kendrick Beach MD   albuterol sulfate HFA (VENTOLIN HFA) 108 (90 Base) MCG/ACT inhaler Inhale 2 puffs into the lungs 4 times daily as needed for Wheezing  Kendrick Beach MD   albuterol (PROVENTIL) (2.5 MG/3ML) 0.083% nebulizer solution Take 3 mLs by nebulization every 6 hours as needed for Wheezing  Kendrick Beach MD   azelastine (ASTELIN) 0.1 % nasal spray 1 spray

## 2023-12-01 ENCOUNTER — HOSPITAL ENCOUNTER (OUTPATIENT)
Age: 48
Discharge: HOME | End: 2023-12-01
Payer: COMMERCIAL

## 2023-12-01 LAB
ALBUMIN SERPL-MCNC: 4 GM/DL (ref 3.4–5)
ALP BLD-CCNC: 83 IU/L (ref 40–128)
ALT SERPL-CCNC: 13 U/L (ref 10–40)
ANION GAP SERPL CALCULATED.3IONS-SCNC: 9 MMOL/L (ref 4–16)
AST SERPL-CCNC: 13 IU/L (ref 15–37)
BASOPHILS ABSOLUTE: 0 K/CU MM
BASOPHILS RELATIVE PERCENT: 0.3 % (ref 0–1)
BILIRUB SERPL-MCNC: 0.3 MG/DL (ref 0–1)
BUN SERPL-MCNC: 12 MG/DL (ref 6–23)
CALCIUM SERPL-MCNC: 9.1 MG/DL (ref 8.3–10.6)
CHLORIDE BLD-SCNC: 101 MMOL/L (ref 99–110)
CHOLEST SERPL-MCNC: 163 MG/DL
CO2: 26 MMOL/L (ref 21–32)
CREAT SERPL-MCNC: 0.7 MG/DL (ref 0.6–1.1)
DIFFERENTIAL TYPE: ABNORMAL
EOSINOPHILS ABSOLUTE: 0.3 K/CU MM
EOSINOPHILS RELATIVE PERCENT: 2.2 % (ref 0–3)
GFR SERPL CREATININE-BSD FRML MDRD: >60 ML/MIN/1.73M2
GLUCOSE SERPL-MCNC: 143 MG/DL (ref 70–99)
HCT VFR BLD CALC: 39.9 % (ref 37–47)
HDLC SERPL-MCNC: 42 MG/DL
HEMOGLOBIN: 12.7 GM/DL (ref 12.5–16)
IMMATURE NEUTROPHIL %: 0.5 % (ref 0–0.43)
LDLC SERPL CALC-MCNC: 98 MG/DL
LYMPHOCYTES ABSOLUTE: 2.5 K/CU MM
LYMPHOCYTES RELATIVE PERCENT: 21.8 % (ref 24–44)
MCH RBC QN AUTO: 28 PG (ref 27–31)
MCHC RBC AUTO-ENTMCNC: 31.8 % (ref 32–36)
MCV RBC AUTO: 87.9 FL (ref 78–100)
MONOCYTES ABSOLUTE: 0.5 K/CU MM
MONOCYTES RELATIVE PERCENT: 4 % (ref 0–4)
NUCLEATED RBC %: 0 %
PDW BLD-RTO: 13.3 % (ref 11.7–14.9)
PLATELET # BLD: 251 K/CU MM (ref 140–440)
PMV BLD AUTO: 9.3 FL (ref 7.5–11.1)
POTASSIUM SERPL-SCNC: 3.8 MMOL/L (ref 3.5–5.1)
RBC # BLD: 4.54 M/CU MM (ref 4.2–5.4)
SEGMENTED NEUTROPHILS ABSOLUTE COUNT: 8.1 K/CU MM
SEGMENTED NEUTROPHILS RELATIVE PERCENT: 71.2 % (ref 36–66)
SODIUM BLD-SCNC: 136 MMOL/L (ref 135–145)
TOTAL IMMATURE NEUTOROPHIL: 0.06 K/CU MM
TOTAL NUCLEATED RBC: 0 K/CU MM
TOTAL PROTEIN: 6.3 GM/DL (ref 6.4–8.2)
TRIGL SERPL-MCNC: 113 MG/DL
WBC # BLD: 11.3 K/CU MM (ref 4–10.5)

## 2023-12-01 PROCEDURE — 85025 COMPLETE CBC W/AUTO DIFF WBC: CPT

## 2023-12-01 PROCEDURE — 80053 COMPREHEN METABOLIC PANEL: CPT

## 2023-12-01 PROCEDURE — 36415 COLL VENOUS BLD VENIPUNCTURE: CPT

## 2023-12-01 PROCEDURE — 80061 LIPID PANEL: CPT
